# Patient Record
Sex: MALE | Race: OTHER | Employment: PART TIME | URBAN - METROPOLITAN AREA
[De-identification: names, ages, dates, MRNs, and addresses within clinical notes are randomized per-mention and may not be internally consistent; named-entity substitution may affect disease eponyms.]

---

## 2017-08-19 ENCOUNTER — OFFICE VISIT (OUTPATIENT)
Dept: FAMILY MEDICINE CLINIC | Age: 42
End: 2017-08-19

## 2017-08-19 VITALS
WEIGHT: 257 LBS | TEMPERATURE: 98.9 F | HEART RATE: 99 BPM | BODY MASS INDEX: 38.95 KG/M2 | DIASTOLIC BLOOD PRESSURE: 84 MMHG | SYSTOLIC BLOOD PRESSURE: 133 MMHG | HEIGHT: 68 IN

## 2017-08-19 DIAGNOSIS — F43.10 PTSD (POST-TRAUMATIC STRESS DISORDER): ICD-10-CM

## 2017-08-19 DIAGNOSIS — G47.00 INSOMNIA, UNSPECIFIED TYPE: ICD-10-CM

## 2017-08-19 DIAGNOSIS — E78.00 HIGH CHOLESTEROL: ICD-10-CM

## 2017-08-19 DIAGNOSIS — R73.9 ELEVATED BLOOD SUGAR: Primary | ICD-10-CM

## 2017-08-19 DIAGNOSIS — E11.9 TYPE 2 DIABETES MELLITUS WITHOUT COMPLICATION, WITHOUT LONG-TERM CURRENT USE OF INSULIN (HCC): ICD-10-CM

## 2017-08-19 DIAGNOSIS — I10 ESSENTIAL HYPERTENSION: ICD-10-CM

## 2017-08-19 LAB — GLUCOSE POC: NORMAL MG/DL

## 2017-08-19 RX ORDER — ATORVASTATIN CALCIUM 40 MG/1
40 TABLET, FILM COATED ORAL DAILY
Qty: 90 TAB | Refills: 1 | Status: SHIPPED | OUTPATIENT
Start: 2017-08-19 | End: 2017-11-14 | Stop reason: SDUPTHER

## 2017-08-19 RX ORDER — VALSARTAN AND HYDROCHLOROTHIAZIDE 320; 12.5 MG/1; MG/1
1 TABLET, FILM COATED ORAL DAILY
Qty: 90 TAB | Refills: 1 | Status: SHIPPED | OUTPATIENT
Start: 2017-08-19 | End: 2017-11-14 | Stop reason: ALTCHOICE

## 2017-08-19 RX ORDER — VALSARTAN AND HYDROCHLOROTHIAZIDE 320; 12.5 MG/1; MG/1
1 TABLET, FILM COATED ORAL DAILY
Qty: 90 TAB | Refills: 1 | Status: SHIPPED | OUTPATIENT
Start: 2017-08-19 | End: 2017-08-19 | Stop reason: SDUPTHER

## 2017-08-19 RX ORDER — METFORMIN HYDROCHLORIDE 1000 MG/1
1000 TABLET ORAL 2 TIMES DAILY WITH MEALS
Qty: 180 TAB | Refills: 1 | Status: SHIPPED | OUTPATIENT
Start: 2017-08-19 | End: 2017-11-14 | Stop reason: SDUPTHER

## 2017-08-19 RX ORDER — FENOFIBRATE 150 MG/1
CAPSULE ORAL
Qty: 90 CAP | Refills: 1 | Status: SHIPPED | OUTPATIENT
Start: 2017-08-19 | End: 2017-08-19

## 2017-08-19 RX ORDER — GLIMEPIRIDE 4 MG/1
4 TABLET ORAL
Qty: 90 TAB | Refills: 1 | Status: SHIPPED | OUTPATIENT
Start: 2017-08-19 | End: 2017-11-14 | Stop reason: SDUPTHER

## 2017-08-19 RX ORDER — ATORVASTATIN CALCIUM 40 MG/1
40 TABLET, FILM COATED ORAL DAILY
Qty: 90 TAB | Refills: 1 | Status: SHIPPED | OUTPATIENT
Start: 2017-08-19 | End: 2017-08-19 | Stop reason: SDUPTHER

## 2017-08-19 RX ORDER — AMITRIPTYLINE HYDROCHLORIDE 10 MG/1
10 TABLET, FILM COATED ORAL
Qty: 30 TAB | Refills: 2 | Status: SHIPPED | OUTPATIENT
Start: 2017-08-19 | End: 2017-11-14

## 2017-08-19 NOTE — PROGRESS NOTES
Results for orders placed or performed in visit on 08/19/17   AMB POC GLUCOSE BLOOD, BY GLUCOSE MONITORING DEVICE   Result Value Ref Range    Glucose POC  mg/dL

## 2017-08-19 NOTE — PROGRESS NOTES
Assessment/Plan:    Diagnoses and all orders for this visit:    1. Elevated blood sugar  -     AMB POC GLUCOSE BLOOD, BY GLUCOSE MONITORING DEVICE    2. Insomnia, unspecified type  -     amitriptyline (ELAVIL) 10 mg tablet; Take 1 Tab by mouth nightly. 3. High cholesterol  -     Fenofibrate 150 mg cap; Si po qd  -     atorvastatin (LIPITOR) 40 mg tablet; Take 1 Tab by mouth daily. 4. Type 2 diabetes mellitus without complication, without long-term current use of insulin (HCC)  -     metFORMIN (GLUCOPHAGE) 1,000 mg tablet; Take 1 Tab by mouth two (2) times daily (with meals). -     glimepiride (AMARYL) 4 mg tablet; Take 1 Tab by mouth every morning. 5. Essential hypertension  -     valsartan-hydroCHLOROthiazide (DIOVAN-HCT) 320-12.5 mg per tablet; Take 1 Tab by mouth daily. 6. PTSD (post-traumatic stress disorder)  He is undergoing counseling at [facility], next appt is this coming up week    Follow-up Disposition:  Return in about 4 weeks (around 2017). LUZ Blount 72 expressed understanding of this plan. An AVS was printed and given to the patient.      ----------------------------------------------------------------------    Chief Complaint   Patient presents with    Elevated Blood Pressure    Blood sugar problem    Cholesterol Problem       History of Present Illness:  New pt here for med refill on chronic meds. He is a diabetic with high cholesterol and hypertension. He was being treated by Dr Patsy Ballesteros at [facility] [place] for years but he has retired. The pt has been stretching his meds for the past 4 months. Additionally, he often was working out of state so he couldn't get in for a visit. For example, he has been taking only one of his metformin 1000mg daily rather then the 2 ordered. He has done this with all of his meds  He blood sugar is elevated and this is probably the reason why.  He often has to eat \"on the streets\" due to work so he can't always follow a diabetic diet. He is under the care of [facility] for counseling. They sent a note to me that states that the pt is suffering from anxiety. The pt tells me that he was accused by a former friend of calling the police on his brother and getting him sent to [facility]. The pt told the other mike that he did not do it but now feels threatened by the accusor. He is afraid that he is going to get in trouble for something that he did not do. He is feeling nervous and can't sleep at night since the incident about 2 months ago. He has weekly sessions with his counselor already scheduled. They have a bilingual counselor there    He starts a new job in 2 days that will require within state travel so he should be able to come back for check ups      No past medical history on file. Current Outpatient Prescriptions   Medication Sig Dispense Refill    metFORMIN (GLUCOPHAGE) 1,000 mg tablet Take 1 Tab by mouth two (2) times daily (with meals). 180 Tab 1    glimepiride (AMARYL) 4 mg tablet Take 1 Tab by mouth every morning. 90 Tab 1    Fenofibrate 150 mg cap Si po qd 90 Cap 1    atorvastatin (LIPITOR) 40 mg tablet Take 1 Tab by mouth daily. 90 Tab 1    valsartan-hydroCHLOROthiazide (DIOVAN-HCT) 320-12.5 mg per tablet Take 1 Tab by mouth daily. 90 Tab 1    amitriptyline (ELAVIL) 10 mg tablet Take 1 Tab by mouth nightly. 30 Tab 2       Allergies no known allergies    Social History   Substance Use Topics    Smoking status: Never Smoker    Smokeless tobacco: Never Used    Alcohol use No       No family history on file.     Physical Exam:     Visit Vitals    /84 (BP 1 Location: Left arm, BP Patient Position: Sitting)    Pulse 99    Temp 98.9 °F (37.2 °C) (Oral)    Ht 5' 7.91\" (1.725 m)    Wt 257 lb (116.6 kg)    BMI 39.18 kg/m2       A&Ox3  WDWN NAD  Respirations normal and non labored

## 2017-08-19 NOTE — PROGRESS NOTES
87 Svitlana Smith - reviewed meds , reordered Lipitor and Diovan to print, pt will take these with [employer] card to [facility] as they are less expensive there. All others he will  [facility],  Signed 2 records request form to fax to Dr Roberto Henry and Dr Eddi Knight to get old records. Pt taken to registration to make f/u appt in 6 weeks, asked him to come in to have labs done 2 weeks prior to his appointment. Copied letter from [email or web address] .

## 2017-08-19 NOTE — PATIENT INSTRUCTIONS
Consejos de nutrición para la diabetes: Después de la consulta  [Nutrition Tips for Diabetes: After Your Visit]  Instrucciones de cuidado  Rebecca dieta saludable es importante para el manejo de la diabetes. Puede ayudarle a bajar de peso (si lo necesita) y a no recuperarlo. Esta dieta le da los nutrientes y la energía que ackerman cuerpo necesita y le ayuda a prevenir enfermedades cardíacas (del corazón). Sin embargo, esto no significa que en rebecca dieta para personas con diabetes usted tenga que consumir alimentos especiales. Usted [employer] mismos alimentos que ackerman poncho, incluso dulces ocasionalmente y otros alimentos favoritos. Kathy debe tener en cuenta la cantidad y la frecuencia con que come ciertos alimentos. El plan correcto para usted incluirá alimentos que le ayuden a mantener ackerman nivel de azúcar en la [place] en límites sanos. Trate de comer alimentos variados y [employer] carbohidratos radha todo el día. Los carbohidratos aumentan el nivel de azúcar en la [place] y lo hacen con mayor rapidez que otros nutrientes. Estos pueden encontrarse en el azúcar, los panes y cereales, las frutas, en los vegetales con almidones, clarence las michell y [place], y en la [place] y el yogur. Sería conveniente que colabore con un dietista o educador de diabetes para que le ayuden a planificar las comidas y los refrigerios. Si leanna de melvin objetivos es la pérdida de [place], un dietista o educador de diabetes puede ayudarle. Los consejos que siguen a continuación pueden ayudarle a disfrutar melvin comidas y [employer]. La atención de seguimiento es rebecca parte clave de ackerman tratamiento y seguridad. Asegúrese de hacer y acudir a todas las citas, y llame a ackerman médico si está teniendo problemas. También es rebecca buena idea saber los resultados de los exámenes y mantener rebecca lista de los medicamentos que javier. ¿Cómo puede cuidarse en el hogar?   · Aprenda a distinguir los alimentos con carbohidratos y la cantidad de carbohidratos que debe consumir. Un dietista o educador de diabetes puede enseñarle a llevar un control de la cantidad de carbohidratos que consume. · Distribuya los carbohidratos a lo sea del día. Consuma rebecca pequeña porción de carbohidratos en cada rebecca de las comidas, tasha no demasiado de rebecca chris vez. · Planifique melvin comidas para que incluyan alimentos de todos los grupos alimentarios. Estos son [place] y algunos ejemplos de tamaños de porciones:  ¨ Granos: 1 rebanada de pan (1 onza o 28 g), ½ taza de cereal cocido y 1/3 de taza de pasta o arroz cocidos. Estas raciones contienen alrededor de 15 gramos de carbohidratos por porción. Elija comer granos enteros, clarence pan o galletas saladas integrales, silvano y [place] integral, con mayor frecuencia que granos refinados. ¨ Frutas: 1 fruta pequeña fresca, clarence rebecca manzana o rebecca naranja; ½ banana (plátano); ½ taza de fruta picada, cocida o enlatada; ½ taza de jugo de fruta; 1 taza de melón o frambuesa; y 2 cucharadas de frutas secas. Estas raciones contienen alrededor de 15 gramos de carbohidratos por porción. ¨ Productos lácteos: 1 taza de [place] [address] y 2/3 de taza de yogur natural. Estas raciones contienen alrededor de 15 gramos de carbohidratos por porción. ¨ Proteínas: Carne de froylan, nelia, pavo, pescado, huevos, tofu, queso, requesón y [address]). Rebecca porción de carne es 3 onzas, que es aproximadamente el tamaño de rebecca baraja de naipes. Ejemplos de porciones de sustitutos de la carne (igual a 1 onza de carne) son 1/4 de taza de requesón, 1 huevo, 1 cucharada de [address] de [address] y ½ taza de tofu. Estos alimentos contienen muy poca cantidad o nada de carbohidratos por porción. ¨ Vegetales: Los vegetales con almidón claernce ½ taza de frijoles (habichuelas) secos cocidos, arvejas (chícharos), michell o maíz contienen alrededor de 15 gramos de carbohidratos.  Los vegetales sin almidón contienen muy pocos carbohidratos, clarence 1 taza de verduras de hojas verdes crudas (clarence la espinaca), ½ taza de otro vegetal (cocido o vikki) y 3/4 de taza de jugo de verduras. · Use el formato del plato para planificar melvin comidas. Es rebecca [place] y rápida de asegurarse de que consuma comidas balanceadas. También le ayuda a distribuir los carbohidratos radha el día. Divida el plato por tipo de alimento. [address] [place] verduras en medio plato, la carne o melvin sustitutos en un cuarto del plato y los granos o verduras con almidones (clarence arroz integral o rebecca papa) en la cuarta parte restante del plato. A esto puede agregarle un pequeño trozo de fruta y [place] taza de [place] o yogur, según la cantidad de carbohidratos que deba consumir en rebecca comida. · Consulte a ackerman dietista o educador de diabetes sobre las formas de añadir cantidades limitadas de dulces en ackerman plan alimenticio. Usted puede comer estos alimentos de vez en cuando, siempre que incluya la cantidad de carbohidratos que contienen en la cantidad permitida diaria. · Si matt alcohol, limite el consumo a no más que 1 bebida al día si es jhonny y 2 bebidas al día si es hombre. Si está embarazada, ninguna cantidad de alcohol es sarkar. · Las proteínas, grasas y fibras no aumentan tanto el nivel de azúcar en la [place] clarence los carbohidratos. Si consume muchos de estos nutrientes en rebecca comida, el azúcar en la [place] aumentará con mayor lentitud de lo que lo haría de Maria A u Cristian. · Limite las grasas saturadas, clarence las de las joan y los productos lácteos. Trate de reemplazarlos con grasa monoinsaturada clarence, por ejemplo, el aceite de [place]. Esta es rebecca opción más saludable porque las personas con diabetes tienen un riesgo superior al promedio de enfermedad cardíaca. De todos modos, use rebecca cantidad [place] de [place]. Rebecca cucharada de aceite de renner contiene 14 gramos de grasa y 120 calorías. · Hacer ejercicio disminuye el nivel de azúcar en la [place].  Si usted se administra la dosis de insulina por medio de inyecciones o rebecca bomba, puede usar rebecca dosis ernst que si no hace ejercicio. Tenga en cuenta que los horarios son importantes. Si usted hace ejercicio 1 hora después de bianca comido, es posible que ackerman cuerpo necesite menos insulina que si lo hace 3 horas después de la comida. Mida ackerman nivel de azúcar en la [place]annah para saber cómo afecta el ejercicio ackerman necesidad de insulina. · Sigrid ejercicio la mayoría de los días de la [place]. Sigrid por lo menos 30 minutos al día. El ejercicio le ayuda a mantener un peso saludable y a que ackerman cuerpo use la insulina. Caminar es rebecca manera fácil de hacer ejercicio. Aumente en forma gradual la distancia que camina cada día. Quizás también desee nadar, montar en bicicleta o hacer otras actividades. Cuando come afuera  · Aprenda a [employer] de las porciones de alimentos que contienen carbohidratos. Si mide melvin alimentos en el hogar, será más fácil calcular la cantidad de carbohidratos en la porción del restaurante. · Si la comida que pide contiene demasiados carbohidratos (clarence michell, maíz o frijoles horneados), pida en ackerman lugar rebecca comida baja en carbohidratos. Pida rebecca ensalada o vegetales verdes. · Si [place] insulina, mídase el nivel de azúcar antes y después de salir a comer afuera para saber la cantidad que puede comer en el futuro. · Si consume más carbohidratos de lo planeado en rebecca comida, camine o sigrid ejercicio. [place] le ayudará a bajar el nivel de azúcar en la sav[place]. ¿Dónde puede encontrar más información en inglés? David Ruiz a [employer]  Jackie [place] [id number] en la búsqueda para aprender más acerca de \"Consejos de nutrición para la diabetes: Después de la consulta. \"   © 9085-8880 Healthwise, Incorporated. Instrucciones de cuidado adaptadas bajo licencia por Cassie Sapp (which disclaims liability or warranty for this information). Estas instrucciones de cuidado son para usarlas con ackerman profesional clínico registrado.  Si tiene preguntas acerca de rebecca afección médica o de estas instrucciones, pregunte siempre a ackerman profesional de [employer]. Healthwise, Incorporated niega cualquier garantía o responsabilidad por ackerman uso de esta información. Versión del contenido: [phone]; Revisado: 6 agosto, 2013                 Colesterol alto: Instrucciones de cuidado - [ High Cholesterol: Care Instructions ]  Instrucciones de cuidado  El colesterol es un tipo de grasa que está presente en la [place]. Es necesario para varias funciones corporales, clarence producir nuevas células. El colesterol se produce en el cuerpo y [place] proviene de los alimentos que se consumen. Tener colesterol alto significa que tiene demasiado de esta grasa en la [place]. [place] eleva ackerman riesgo de tener un ataque Criss Goodie y un ataque cerebral.  El LDL y el HDL son parte de ackerman colesterol total. El LDL es el colesterol \"alayna\". Un LDL alto puede aumentar ackerman riesgo de tener rebecca enfermedad cardíaca, un ataque [place] y un ataque cerebral. El HDL es el colesterol \"butts\". Ayuda a eliminar el colesterol alayna del organismo. Un HDL alto está vinculado con un riesgo más bajo de tener rebecca enfermedad cardíaca, un ataque al corazón y un ataque cerebral.  Zarina niveles de colesterol ayudan a ackerman médico a determinar ackerman riesgo de tener un ataque al corazón o un ataque cerebral. Usted y ackerman médico pueden hablar acerca de si necesita reducir ackerman riesgo y del tratamiento que sea mejor para usted. Un estilo de galina saludable para el corazón junto con medicamentos puede ayudar a reducir ackerman colesterol y ackerman riesgo. El modo que usted elija para reducir ackerman riesgo dependerá de lo elevado que sea ackerman riesgo de tener un ataque al corazón y un ataque cerebral. También dependerá de lo que piense acerca de rama medicamentos. La atención de seguimiento es rebecca parte clave de ackerman tratamiento y seguridad. Asegúrese de hacer y acudir a todas las citas, y llame a ackerman médico si está teniendo problemas.  También es rebecca buena idea saber los resultados de melvin exámenes y mantener rebecca lista de los medicamentos que javier. ¿Cómo puede cuidarse en el hogar? · Coma alimentos variados todos los martín. [place] opciones son las frutas, las verduras, los granos integrales (clarence la harina de silvano), los frijoles secos y las arvejas (chícharos), las nueces y las semillas, los productos de soya (clarence el tofu) y los productos lácteos con bajo contenido graso o sin grasa. · Reemplace la mantequilla, la margarina y los aceites hidrogenados o parcialmente hidrogenados por aceite de renner o de canola (colza). (Puede consumir margarina de aceite de canola sin grasa trans). · Reemplace las joan matthews por pescado, carne de ave y proteína de soya (clarence el tofu). · Limite el consumo de alimentos procesados y envasados, clarence los \"chips\" (clarence michell fritas), las galletas saladas y las galletas dulces. · Cocine los alimentos al horno, a la roman o al vapor. No los fría. · Manténgase físicamente activo. Josie por lo menos 30 minutos de ejercicio la mayoría de los días de la [place]. Caminar es rebecca buena opción. Podría hacer otras actividades, clarence correr, nadar, [employer] en [place], o jugar al tenis u otros deportes de equipo. · Manténgase en un peso saludable o baje de peso cambiando los hábitos alimenticios y la [place] según lo descrito arriba. Si baja aunque sea solo un poco de [place], incluso 5 a 10 libras (2.2 a 4.5 kg), puede disminuir el riesgo de tener un ataque al corazón o un ataque cerebral.  · No fume. ¿Cuándo debe pedir ayuda? Preste especial atención a los cambios en ackerman nandini y asegúrese de comunicarse con ackerman médico si:  · Necesita ayuda para hacer cambios en el estilo de galina. · Tiene preguntas sobre ackerman medicamento. ¿Dónde puede encontrar más información en inglés? Ariana Telles a http://rick-adrianna.info/. Cyndee Skinner [phone] en la búsqueda para aprender más acerca de \"Colesterol alto:  Instrucciones de cuidado - [ High Cholesterol: Care Instructions ]. \"  Revisado: 3 sara, 2017  Versión del contenido: 11.3  © 3134-6417 Healthwise, Incorporated. Las instrucciones de cuidado fueron adaptadas bajo licencia por [employer] (which disclaims liability or warranty for this information). Si usted tiene [place] afección médica o sobre estas instrucciones, siempre pregunte a ackerman profesional de nandini. Healthwise, Incorporated niega toda garantía o responsabilidad por ackermna uso de esta información.

## 2017-11-01 ENCOUNTER — HOSPITAL ENCOUNTER (INPATIENT)
Age: 42
LOS: 1 days | Discharge: HOME | DRG: 916 | End: 2017-11-02
Attending: EMERGENCY MEDICINE | Admitting: INTERNAL MEDICINE
Payer: SELF-PAY

## 2017-11-01 ENCOUNTER — APPOINTMENT (OUTPATIENT)
Dept: GENERAL RADIOLOGY | Age: 42
DRG: 916 | End: 2017-11-01
Attending: EMERGENCY MEDICINE
Payer: SELF-PAY

## 2017-11-01 DIAGNOSIS — T78.2XXA ANAPHYLAXIS, INITIAL ENCOUNTER: Primary | ICD-10-CM

## 2017-11-01 PROBLEM — K02.9 DENTAL CARIES: Status: ACTIVE | Noted: 2017-11-01

## 2017-11-01 PROBLEM — I10 HTN (HYPERTENSION), BENIGN: Status: ACTIVE | Noted: 2017-11-01

## 2017-11-01 PROBLEM — E11.9 TYPE 2 DIABETES MELLITUS WITHOUT COMPLICATION (HCC): Status: ACTIVE | Noted: 2017-11-01

## 2017-11-01 PROBLEM — E78.5 HYPERLIPIDEMIA: Status: ACTIVE | Noted: 2017-11-01

## 2017-11-01 LAB
ALBUMIN SERPL-MCNC: 3.7 G/DL (ref 3.5–5)
ALBUMIN/GLOB SERPL: 1 {RATIO} (ref 1.1–2.2)
ALP SERPL-CCNC: 78 U/L (ref 45–117)
ALT SERPL-CCNC: 36 U/L (ref 12–78)
ANION GAP SERPL CALC-SCNC: 15 MMOL/L (ref 5–15)
AST SERPL-CCNC: 25 U/L (ref 15–37)
BASOPHILS # BLD: 0 K/UL (ref 0–0.1)
BASOPHILS NFR BLD: 0 % (ref 0–1)
BILIRUB SERPL-MCNC: 0.4 MG/DL (ref 0.2–1)
BNP SERPL-MCNC: 17 PG/ML (ref 0–125)
BUN SERPL-MCNC: 17 MG/DL (ref 6–20)
BUN/CREAT SERPL: 11 (ref 12–20)
CALCIUM SERPL-MCNC: 9.8 MG/DL (ref 8.5–10.1)
CHLORIDE SERPL-SCNC: 104 MMOL/L (ref 97–108)
CK SERPL-CCNC: 171 U/L (ref 39–308)
CO2 SERPL-SCNC: 23 MMOL/L (ref 21–32)
CREAT SERPL-MCNC: 1.54 MG/DL (ref 0.7–1.3)
EOSINOPHIL # BLD: 0 K/UL (ref 0–0.4)
EOSINOPHIL NFR BLD: 0 % (ref 0–7)
ERYTHROCYTE [DISTWIDTH] IN BLOOD BY AUTOMATED COUNT: 14.6 % (ref 11.5–14.5)
GLOBULIN SER CALC-MCNC: 3.6 G/DL (ref 2–4)
GLUCOSE SERPL-MCNC: 262 MG/DL (ref 65–100)
HCT VFR BLD AUTO: 48.3 % (ref 36.6–50.3)
HGB BLD-MCNC: 17 G/DL (ref 12.1–17)
LYMPHOCYTES # BLD: 7.9 K/UL (ref 0.8–3.5)
LYMPHOCYTES NFR BLD: 67 % (ref 12–49)
MCH RBC QN AUTO: 30.4 PG (ref 26–34)
MCHC RBC AUTO-ENTMCNC: 35.2 G/DL (ref 30–36.5)
MCV RBC AUTO: 86.4 FL (ref 80–99)
MONOCYTES # BLD: 0.4 K/UL (ref 0–1)
MONOCYTES NFR BLD: 3 % (ref 5–13)
NEUTS SEG # BLD: 3.5 K/UL (ref 1.8–8)
NEUTS SEG NFR BLD: 30 % (ref 32–75)
PLATELET # BLD AUTO: 222 K/UL (ref 150–400)
POTASSIUM SERPL-SCNC: 3.6 MMOL/L (ref 3.5–5.1)
PROT SERPL-MCNC: 7.3 G/DL (ref 6.4–8.2)
RBC # BLD AUTO: 5.59 M/UL (ref 4.1–5.7)
RBC MORPH BLD: ABNORMAL
SODIUM SERPL-SCNC: 142 MMOL/L (ref 136–145)
TROPONIN I BLD-MCNC: <0.04 NG/ML (ref 0–0.08)
WBC # BLD AUTO: 11.8 K/UL (ref 4.1–11.1)
WBC MORPH BLD: ABNORMAL

## 2017-11-01 PROCEDURE — 74011250636 HC RX REV CODE- 250/636: Performed by: EMERGENCY MEDICINE

## 2017-11-01 PROCEDURE — 99284 EMERGENCY DEPT VISIT MOD MDM: CPT

## 2017-11-01 PROCEDURE — 96374 THER/PROPH/DIAG INJ IV PUSH: CPT

## 2017-11-01 PROCEDURE — 96372 THER/PROPH/DIAG INJ SC/IM: CPT

## 2017-11-01 PROCEDURE — 82550 ASSAY OF CK (CPK): CPT | Performed by: EMERGENCY MEDICINE

## 2017-11-01 PROCEDURE — 85025 COMPLETE CBC W/AUTO DIFF WBC: CPT | Performed by: EMERGENCY MEDICINE

## 2017-11-01 PROCEDURE — 83036 HEMOGLOBIN GLYCOSYLATED A1C: CPT | Performed by: INTERNAL MEDICINE

## 2017-11-01 PROCEDURE — 74011000250 HC RX REV CODE- 250: Performed by: INTERNAL MEDICINE

## 2017-11-01 PROCEDURE — 77030013140 HC MSK NEB VYRM -A

## 2017-11-01 PROCEDURE — 80053 COMPREHEN METABOLIC PANEL: CPT | Performed by: EMERGENCY MEDICINE

## 2017-11-01 PROCEDURE — 96375 TX/PRO/DX INJ NEW DRUG ADDON: CPT

## 2017-11-01 PROCEDURE — 36415 COLL VENOUS BLD VENIPUNCTURE: CPT | Performed by: EMERGENCY MEDICINE

## 2017-11-01 PROCEDURE — 96361 HYDRATE IV INFUSION ADD-ON: CPT

## 2017-11-01 PROCEDURE — 93005 ELECTROCARDIOGRAM TRACING: CPT

## 2017-11-01 PROCEDURE — 74011000250 HC RX REV CODE- 250: Performed by: EMERGENCY MEDICINE

## 2017-11-01 PROCEDURE — 74011250636 HC RX REV CODE- 250/636

## 2017-11-01 PROCEDURE — 71010 XR CHEST PORT: CPT

## 2017-11-01 PROCEDURE — 83880 ASSAY OF NATRIURETIC PEPTIDE: CPT | Performed by: EMERGENCY MEDICINE

## 2017-11-01 PROCEDURE — 84484 ASSAY OF TROPONIN QUANT: CPT

## 2017-11-01 PROCEDURE — 94640 AIRWAY INHALATION TREATMENT: CPT

## 2017-11-01 PROCEDURE — 65610000006 HC RM INTENSIVE CARE

## 2017-11-01 PROCEDURE — 74011250636 HC RX REV CODE- 250/636: Performed by: INTERNAL MEDICINE

## 2017-11-01 RX ORDER — CYCLOBENZAPRINE HCL 10 MG
10 TABLET ORAL
COMMUNITY
End: 2018-05-06

## 2017-11-01 RX ORDER — MAGNESIUM SULFATE 100 %
4 CRYSTALS MISCELLANEOUS AS NEEDED
Status: DISCONTINUED | OUTPATIENT
Start: 2017-11-01 | End: 2017-11-02 | Stop reason: HOSPADM

## 2017-11-01 RX ORDER — GLIMEPIRIDE 4 MG/1
4 TABLET ORAL DAILY
COMMUNITY
End: 2018-05-04 | Stop reason: SDUPTHER

## 2017-11-01 RX ORDER — DIPHENHYDRAMINE HYDROCHLORIDE 50 MG/ML
INJECTION, SOLUTION INTRAMUSCULAR; INTRAVENOUS
Status: COMPLETED
Start: 2017-11-01 | End: 2017-11-01

## 2017-11-01 RX ORDER — TRAMADOL HYDROCHLORIDE 50 MG/1
50 TABLET ORAL
COMMUNITY
End: 2018-05-06

## 2017-11-01 RX ORDER — ACETAMINOPHEN 325 MG/1
650 TABLET ORAL
Status: DISCONTINUED | OUTPATIENT
Start: 2017-11-01 | End: 2017-11-02 | Stop reason: HOSPADM

## 2017-11-01 RX ORDER — FENOFIBRATE 150 MG/1
150 CAPSULE ORAL DAILY
COMMUNITY
End: 2018-05-04 | Stop reason: SDUPTHER

## 2017-11-01 RX ORDER — MORPHINE SULFATE 2 MG/ML
4 INJECTION, SOLUTION INTRAMUSCULAR; INTRAVENOUS ONCE
Status: COMPLETED | OUTPATIENT
Start: 2017-11-01 | End: 2017-11-01

## 2017-11-01 RX ORDER — DEXTROSE 50 % IN WATER (D50W) INTRAVENOUS SYRINGE
12.5-25 AS NEEDED
Status: DISCONTINUED | OUTPATIENT
Start: 2017-11-01 | End: 2017-11-02 | Stop reason: HOSPADM

## 2017-11-01 RX ORDER — MORPHINE SULFATE 2 MG/ML
2 INJECTION, SOLUTION INTRAMUSCULAR; INTRAVENOUS
Status: DISCONTINUED | OUTPATIENT
Start: 2017-11-01 | End: 2017-11-02 | Stop reason: HOSPADM

## 2017-11-01 RX ORDER — ENOXAPARIN SODIUM 100 MG/ML
40 INJECTION SUBCUTANEOUS EVERY 24 HOURS
Status: DISCONTINUED | OUTPATIENT
Start: 2017-11-02 | End: 2017-11-02 | Stop reason: HOSPADM

## 2017-11-01 RX ORDER — ONDANSETRON 2 MG/ML
INJECTION INTRAMUSCULAR; INTRAVENOUS
Status: COMPLETED
Start: 2017-11-01 | End: 2017-11-01

## 2017-11-01 RX ORDER — INSULIN LISPRO 100 [IU]/ML
INJECTION, SOLUTION INTRAVENOUS; SUBCUTANEOUS
Status: DISCONTINUED | OUTPATIENT
Start: 2017-11-02 | End: 2017-11-02 | Stop reason: HOSPADM

## 2017-11-01 RX ORDER — METHOCARBAMOL 500 MG/1
500 TABLET, FILM COATED ORAL
COMMUNITY
End: 2018-05-06

## 2017-11-01 RX ORDER — AMOXICILLIN AND CLAVULANATE POTASSIUM 875; 125 MG/1; MG/1
1 TABLET, FILM COATED ORAL EVERY 12 HOURS
COMMUNITY
End: 2017-11-02

## 2017-11-01 RX ORDER — ALBUTEROL SULFATE 1.25 MG/3ML
1.25 SOLUTION RESPIRATORY (INHALATION)
Status: DISCONTINUED | OUTPATIENT
Start: 2017-11-01 | End: 2017-11-02 | Stop reason: HOSPADM

## 2017-11-01 RX ORDER — METFORMIN HYDROCHLORIDE 1000 MG/1
1000 TABLET ORAL 2 TIMES DAILY
COMMUNITY
End: 2018-05-04 | Stop reason: SDUPTHER

## 2017-11-01 RX ORDER — EPINEPHRINE 1 MG/ML
0.3 INJECTION, SOLUTION, CONCENTRATE INTRAVENOUS
Status: COMPLETED | OUTPATIENT
Start: 2017-11-01 | End: 2017-11-01

## 2017-11-01 RX ORDER — SODIUM CHLORIDE 0.9 % (FLUSH) 0.9 %
5-10 SYRINGE (ML) INJECTION AS NEEDED
Status: DISCONTINUED | OUTPATIENT
Start: 2017-11-01 | End: 2017-11-02 | Stop reason: HOSPADM

## 2017-11-01 RX ORDER — NALOXONE HYDROCHLORIDE 0.4 MG/ML
0.4 INJECTION, SOLUTION INTRAMUSCULAR; INTRAVENOUS; SUBCUTANEOUS AS NEEDED
Status: DISCONTINUED | OUTPATIENT
Start: 2017-11-01 | End: 2017-11-02 | Stop reason: HOSPADM

## 2017-11-01 RX ORDER — SODIUM CHLORIDE 0.9 % (FLUSH) 0.9 %
5-10 SYRINGE (ML) INJECTION EVERY 8 HOURS
Status: DISCONTINUED | OUTPATIENT
Start: 2017-11-02 | End: 2017-11-02 | Stop reason: HOSPADM

## 2017-11-01 RX ORDER — GUAIFENESIN 100 MG/5ML
81 LIQUID (ML) ORAL DAILY
COMMUNITY

## 2017-11-01 RX ORDER — FAMOTIDINE 10 MG/ML
20 INJECTION INTRAVENOUS
Status: COMPLETED | OUTPATIENT
Start: 2017-11-01 | End: 2017-11-01

## 2017-11-01 RX ORDER — SODIUM CHLORIDE 9 MG/ML
1000 INJECTION, SOLUTION INTRAVENOUS ONCE
Status: COMPLETED | OUTPATIENT
Start: 2017-11-01 | End: 2017-11-01

## 2017-11-01 RX ORDER — DIPHENHYDRAMINE HYDROCHLORIDE 50 MG/ML
50 INJECTION, SOLUTION INTRAMUSCULAR; INTRAVENOUS
Status: COMPLETED | OUTPATIENT
Start: 2017-11-01 | End: 2017-11-01

## 2017-11-01 RX ORDER — SODIUM CHLORIDE 9 MG/ML
125 INJECTION, SOLUTION INTRAVENOUS CONTINUOUS
Status: DISCONTINUED | OUTPATIENT
Start: 2017-11-02 | End: 2017-11-02

## 2017-11-01 RX ORDER — ATORVASTATIN CALCIUM 40 MG/1
40 TABLET, FILM COATED ORAL DAILY
COMMUNITY
End: 2018-05-04 | Stop reason: SDUPTHER

## 2017-11-01 RX ORDER — DIPHENHYDRAMINE HYDROCHLORIDE 50 MG/ML
25 INJECTION, SOLUTION INTRAMUSCULAR; INTRAVENOUS
Status: DISCONTINUED | OUTPATIENT
Start: 2017-11-01 | End: 2017-11-02 | Stop reason: HOSPADM

## 2017-11-01 RX ORDER — ONDANSETRON 2 MG/ML
8 INJECTION INTRAMUSCULAR; INTRAVENOUS
Status: COMPLETED | OUTPATIENT
Start: 2017-11-01 | End: 2017-11-01

## 2017-11-01 RX ORDER — PROCHLORPERAZINE EDISYLATE 5 MG/ML
5 INJECTION INTRAMUSCULAR; INTRAVENOUS
Status: DISCONTINUED | OUTPATIENT
Start: 2017-11-01 | End: 2017-11-02 | Stop reason: HOSPADM

## 2017-11-01 RX ADMIN — METHYLPREDNISOLONE SODIUM SUCCINATE 125 MG: 125 INJECTION, POWDER, FOR SOLUTION INTRAMUSCULAR; INTRAVENOUS at 19:15

## 2017-11-01 RX ADMIN — FAMOTIDINE 20 MG: 10 INJECTION, SOLUTION INTRAVENOUS at 19:30

## 2017-11-01 RX ADMIN — ALBUTEROL SULFATE 1 DOSE: 2.5 SOLUTION RESPIRATORY (INHALATION) at 20:11

## 2017-11-01 RX ADMIN — FAMOTIDINE 20 MG: 10 INJECTION, SOLUTION INTRAVENOUS at 23:27

## 2017-11-01 RX ADMIN — MORPHINE SULFATE 4 MG: 2 INJECTION, SOLUTION INTRAMUSCULAR; INTRAVENOUS at 20:54

## 2017-11-01 RX ADMIN — SODIUM CHLORIDE 1000 ML: 900 INJECTION, SOLUTION INTRAVENOUS at 19:35

## 2017-11-01 RX ADMIN — DIPHENHYDRAMINE HYDROCHLORIDE 50 MG: 50 INJECTION INTRAMUSCULAR; INTRAVENOUS at 19:15

## 2017-11-01 RX ADMIN — ENOXAPARIN SODIUM 40 MG: 40 INJECTION SUBCUTANEOUS at 23:27

## 2017-11-01 RX ADMIN — SODIUM CHLORIDE 125 ML/HR: 900 INJECTION, SOLUTION INTRAVENOUS at 23:18

## 2017-11-01 RX ADMIN — ONDANSETRON 8 MG: 2 INJECTION INTRAMUSCULAR; INTRAVENOUS at 19:31

## 2017-11-01 RX ADMIN — EPINEPHRINE 0.3 MG: 1 INJECTION, SOLUTION, CONCENTRATE INTRAVENOUS at 19:10

## 2017-11-01 RX ADMIN — DIPHENHYDRAMINE HYDROCHLORIDE 50 MG: 50 INJECTION, SOLUTION INTRAMUSCULAR; INTRAVENOUS at 19:15

## 2017-11-01 NOTE — ED PROVIDER NOTES
HPI Comments: 35 y.o. male with past medical history significant for HTN who presents from home with wife at bedside with chief complaint of allergic reaction. As per the wife, the pt was seen at [facility] 2 hours PTA, was dx with a dental infection, and was prescribed Augmentin. The pt took a dose of Augmentin 10 minutes PTA. Within a minute, the pt started to become itchy and his face started to swell. The pt arrived to the ED by car. Upon presentation, he was in acute severe distress with marked facial swelling and difficulty breathing. He was prof dis [place] immediately back. It was clear he was going through severe anaphylaxis. He was given . 3 CC epinephrine IM. The pt was then placed on a monitor, IV accessed obtained. The pt was placed in BiPAP. Then the pt rapidly improved to the point where the BiPAP was discontinued. He was placed on 4L nasal canula O2. The pt had received 50 Benedryl, 20 Pepcid ,  mg solumedrol just after being placed on BiPAP. He had an episode of profuse vomiting and was given 8 mg IV Zofran. Within 10 minutes, the pt started to improve significantly. Within 20 minutes, his diaphoresis resolved. The pt became comfortable and was able to talk with us. PCP: No primary care provider on file. Full history, physical exam, and ROS unable to be obtained due to:  Respiratory distress. Note written by Fulton Hodgkin, Scribe, as dictated by Shiela Bland MD 7:34 PM      The history is provided by the patient. No  was used. No past medical history on file. No past surgical history on file. No family history on file. Social History     Social History    Marital status: N/A     Spouse name: N/A    Number of children: N/A    Years of education: N/A     Occupational History    Not on file.      Social History Main Topics    Smoking status: Not on file    Smokeless tobacco: Not on file    Alcohol use Not on file    Drug use: Not on file    Sexual activity: Not on file     Other Topics Concern    Not on file     Social History Narrative         ALLERGIES: Augmentin [amoxicillin-pot clavulanate] and Penicillins    Review of Systems   Unable to perform ROS: Other   All other systems reviewed and are negative. There were no vitals filed for this visit. Physical Exam   Constitutional: He is oriented to person, place, and time. He appears well-developed and well-nourished. No distress. HENT:   Head: Normocephalic and atraumatic. Facial swelling;     Eyes: Conjunctivae are normal. No scleral icterus. Neck: Neck supple. No tracheal deviation present. Cardiovascular: Normal rate, regular rhythm, normal heart sounds and intact distal pulses. Exam reveals no gallop and no friction rub. No murmur heard. Pulmonary/Chest: Breath sounds normal. He is in respiratory distress (acute). He has no wheezes. He has no rales. Pulse oximeter reading 88% RA; Abdominal: Soft. He exhibits no distension. There is no tenderness. There is no rebound and no guarding. Musculoskeletal: He exhibits no edema. Neurological: He is alert and oriented to person, place, and time. Skin: Skin is warm. No rash noted. He is diaphoretic (profuse). Psychiatric: He has a normal mood and affect. Nursing note and vitals reviewed.      Note written by Lulu Arenas, as dictated by Germán Gross MD 7:36 PM    MDM  Number of Diagnoses or Management Options  Anaphylaxis, initial encounter:      Amount and/or Complexity of Data Reviewed  Clinical lab tests: ordered and reviewed  Tests in the radiology section of CPT®: ordered and reviewed  Tests in the medicine section of CPT®: ordered and reviewed  Discussion of test results with the performing providers: yes  Obtain history from someone other than the patient: yes  Discuss the patient with other providers: yes    Total critical care time spent exclusive of procedures: 36 minutes  ED Course       Procedures  ED EKG interpretation:  Rhythm: sinus tachycardia; and regular . Rate (approx.): 120; Axis: normal; ST/T wave: normal; septal infarct; Note written by Lulu Boudreaux, as dictated by Jonelle Monteiro MD 7:16 PM    7:25 PM  Checked on the pt. He is feeling better. 7:42PM  Rechecked the pt. He is doing better.       8:31 PM  Spoke with [facility]. The pt had a shot of Toradol at [facility].

## 2017-11-01 NOTE — IP AVS SNAPSHOT
303 [facility] 104 1007 [facility] 
[phone] Patient: Amarilis Boogie MRN: [id number] :1977 About your hospitalization You were admitted on:  2017 You last received care in the:  [facility] 3 INTENSIVE CARE You were discharged on:  2017 Why you were hospitalized Your primary diagnosis was: Anaphylactic Reaction Your diagnoses also included:  Type 2 Diabetes Mellitus Without Complication (Hcc), Dental Caries, Htn (Hypertension), Benign, Hyperlipidemia Things You Need To Do (next 8 weeks) Follow up with follow up with primary care doctor within 3 days Follow up with see an allergist for allergy testing  Go to [place], [facility] [facility] follow-up appointment at [address] [place] [address]. Please arrive at 7:45AM to complete new patient paperwork. Phone:  [phone] Where:  [address], [address] [place] [address] Discharge Orders None A check wily indicates which time of day the medication should be taken. My Medications STOP taking these medications AUGMENTIN 875-125 mg per tablet Generic drug:  amoxicillin-clavulanate  
   
  
 valsartan 320 mg tab 320 mg, hydroCHLOROthiazide 12.5 mg cap 12.5 mg  
   
  
  
TAKE these medications as instructed Instructions Each Dose to Equal  
 Morning Noon Evening Bedtime  
 aspirin 81 mg chewable tablet Your last dose was: Your next dose is: Take 81 mg by mouth daily. 81 mg  
    
   
   
   
  
 atorvastatin 40 mg tablet Commonly known as:  LIPITOR Your last dose was: Your next dose is: Take 40 mg by mouth daily. 40 mg  
    
   
   
   
  
 clindamycin 300 mg capsule Commonly known as:  CLEOCIN Your last dose was: Your next dose is: Take 1 Cap by mouth three (3) times daily for 5 days. 300 mg  
    
   
   
   
  
 cyclobenzaprine 10 mg tablet Commonly known as:  FLEXERIL Your last dose was: Your next dose is: Take 10 mg by mouth three (3) times daily as needed for Muscle Spasm(s). 10 mg  
    
   
   
   
  
 diphenhydrAMINE 25 mg capsule Commonly known as:  BENADRYL Your last dose was: Your next dose is: Take 1 Cap by mouth every six (6) hours as needed for up to 10 days. 25 mg  
    
   
   
   
  
 EPINEPHrine 0.3 mg/0.3 mL injection Commonly known as:  Stoney Roman Your last dose was: Your next dose is: 0.3 mL by IntraMUSCular route once as needed for up to 1 dose. 0.3 mg  
    
   
   
   
  
 famotidine 20 mg tablet Commonly known as:  PEPCID Your last dose was: Your next dose is: Take 1 Tab by mouth two (2) times a day for 3 days. 20 mg Fenofibrate 150 mg Cap Your last dose was: Your next dose is: Take 150 mg by mouth daily. 150 mg  
    
   
   
   
  
 glimepiride 4 mg tablet Commonly known as:  AMARYL Your last dose was: Your next dose is: Take 4 mg by mouth daily. 4 mg  
    
   
   
   
  
 metFORMIN 1,000 mg tablet Commonly known as:  GLUCOPHAGE Your last dose was: Your next dose is: Take 1,000 mg by mouth two (2) times a day. 1000 mg  
    
   
   
   
  
 OTHER(NON-FORMULARY) Your last dose was: Your next dose is:    
   
   
 Centrum Heart MVI daily Centrum Whole Body MVI daily  
     
   
   
   
  
 predniSONE 20 mg tablet Commonly known as:  Berle Pouch Your last dose was: Your next dose is:    
   
   
 3 tabs (60 mg) daily x 2 days, then 2 tabs (40 mg) daily x 2 days, then 1 tabs (20 mg) daily x 2 days ROBAXIN 500 mg tablet Generic drug:  methocarbamol Your last dose was: Your next dose is: Take 500 mg by mouth daily as needed. 500 mg  
    
   
   
   
  
 traMADol 50 mg tablet Commonly known as:  ULTRAM  
   
Your last dose was: Your next dose is: Take 50 mg by mouth every eight (8) hours as needed for Pain. 50 mg Where to Get Your Medications Information on where to get these meds will be given to you by the nurse or doctor. ! Ask your nurse or doctor about these medications  
  clindamycin 300 mg capsule  
 diphenhydrAMINE 25 mg capsule EPINEPHrine 0.3 mg/0.3 mL injection  
 famotidine 20 mg tablet  
 predniSONE 20 mg tablet Discharge Instructions HOSPITALIST DISCHARGE INSTRUCTIONS 
NAME: Dariela Zaidi :  1977 MRN:  [id number] Date/Time:  2017 8:08 AM 
 
ADMIT DATE: 2017 DISCHARGE DATE: 2017 PRINCIPAL DISCHARGE DIAGNOSES: 
Severe allergic reaction (anaphylaxis) MEDICATIONS: 
· It is important that you take the medication exactly as they are prescribed. Note the changes and additions to your medications. Be sure you understand these changes before you are discharged today. · Keep your medication in the bottles provided by the pharmacist and keep a list of the medication names, dosages, and times to be taken in your wallet. · Do not take other medications without consulting your doctor. · Do not take Penicillins, Augmentin, Amoxicillin, Toradol and other NSAIDs (nonsteroid anti-inflammatory drugs) such as Motrin, Aleve, Advil, ibuprofen, Naproxen, etc. 
 
 
Pain Management: per above medications What to do at [facility] Recommended diet:  Cardiac Diet and Diabetic Diet Recommended activity: Activity as tolerated If you experience any of the following symptoms then please call your primary care physician or return to the emergency room if you cannot get hold of your doctor: 
rash, severe chest pain, shortness of breath, facial or tongue swelling, severe abdominal pain, nausea, vomiting, diarrhea, or other severe concerning symptoms that brought you to the hospital in the first place Follow Up: Follow-up Information Follow up With Details Comments Contact Info  
 follow up with primary care doctor within 3 days     
 see an allergist for allergy testing Jr Hicks MD Go on 11/6/2017 Hospital follow-up appointment at 8:00AM. Please arrive at 7:45AM to complete new patient paperwork. [facility] [place] [address] [address] 
[phone] Information obtained by : 
I understand that if any problems occur once I am at home I am to contact my physician. I understand and acknowledge receipt of the instructions indicated above. Physician's or R.N.'s Signature                                                                  Date/Time Patient or Representative Signature                                                          Date/Time [employer] Announcement We are excited to announce that we are making your provider's discharge notes available to you in [employer]. You will see these notes when they are completed and signed by the physician that discharged you from your recent hospital stay. If you have any questions or concerns about any information you see in INCHRONt, please call the Health Information Department where you were seen or reach out to your Primary Care Provider for more information about your plan of care. Introducing [facility]! [facility] introduce jonas Granados .  Marty rankin partes de ackerman expediente médico, enviar por correo electrónico la oficina de ackerman médico y solicitar renovaciones de medicamentos en línea. En ackerman navegador de Internet , Stephany Ríos a [email or web address] Sigrid clic en el usuario por Jewel Burnett? Harvic Bustosmen clic aquí en la sesión Ellan Swansea. Verá la página de registro Bard. Ingrese ackerman código de [employer] juliocesar y clarence aparece a continuación. Usted no tendrá que [employer] código después de bianca completado el proceso de registro . Si usted no se inscribe antes de la fecha de caducidad , debe solicitar un nuevo código. · MyChart Código de acceso : [id number] Expires: 1/31/2018  9:26 AM 
 
Ingresa los últimos cuatro dígitos de ackerman Número de Seguro Social ( xxxx ) y fecha de nacimiento ( dd / mm / aaaa ) clarence se indica y sigrid clic en Enviar. Usted será llevado a la siguiente página de registro . Crear un ID MyChart . Esta será ackerman ID de inicio de sesión de MyChart y no puede ser [place] , por lo que pensar en rebecca que es [place] y fácil de recordar . Crear rebecca contraseña MyChart . Usted puede cambiar ackerman contraseña en cualquier momento . Ingrese ackerman Password Reset de preguntas y Pearl . [place]man[place] se puede utilizar en un momento posterior si usted olvida ackerman contraseña. Introduzca ackerman dirección de correo electrónico . Therese Graves recibirá rebecca notificación por correo electrónico cuando la nueva información está disponible en MyChart . Myron Ready clic en Registrarse. Celestino Schirmer nazanin y descargar porciones de ackerman expediente médico. 
Sigrid clic en el enlace de descarga del menú Resumen para descargar rebecca copia portátil de ackerman información médica . Si tiene [employer] , por favor visite la sección de preguntas frecuentes del sitio web MyChart . Recuerde, MyChart NO es que se utilizará para las necesidades urgentes. Para emergencias médicas , llame al 911 . Ahora disponible en ackerman iPhone y Android ! Unresulted Labs-Please follow up with your PCP about these lab tests Order Current Status CULTURE, MRSA In process Providers Seen During Your Hospitalization Provider Specialty Primary office phone Sy Maldonado MD Emergency Medicine [phone] Santos De La Torre MD Internal Medicine [phone] Saida Calvo MD Internal Medicine [phone] Your Primary Care Physician (PCP) Primary Care Physician Office Phone Office Fax NONE ** None ** ** None ** You are allergic to the following Allergen Reactions Augmentin (Amoxicillin-Pot Clavulanate) Anaphylaxis Penicillins Anaphylaxis Toradol (Ketorolac) Anaphylaxis Recent Documentation Height Weight BMI Smoking Status 1.778 m 115.8 kg 36.63 kg/m2 Never Smoker Emergency Contacts Name Discharge Info Relation Home Work Mobile [facility] DISCHARGE CAREGIVER [3] Friend [5]   [phone] Patient Belongings The following personal items are in your possession at time of discharge: 
  Dental Appliances: None  Visual Aid: None      Home Medications: None   Jewelry: None  Clothing: At bedside, Pants    Other Valuables: Cell Phone Please provide this summary of care documentation to your next provider. Signatures-by signing, you are acknowledging that this After Visit Summary has been reviewed with you and you have received a copy. Patient Signature:  ____________________________________________________________ Date:  ____________________________________________________________  
  
Ashley Landa Provider Signature:  ____________________________________________________________ Date:  ____________________________________________________________

## 2017-11-02 VITALS
BODY MASS INDEX: 36.55 KG/M2 | HEART RATE: 89 BPM | OXYGEN SATURATION: 93 % | RESPIRATION RATE: 24 BRPM | DIASTOLIC BLOOD PRESSURE: 61 MMHG | SYSTOLIC BLOOD PRESSURE: 113 MMHG | WEIGHT: 255.29 LBS | TEMPERATURE: 97.9 F | HEIGHT: 70 IN

## 2017-11-02 LAB
ALBUMIN SERPL-MCNC: 3.3 G/DL (ref 3.5–5)
ALBUMIN/GLOB SERPL: 1 {RATIO} (ref 1.1–2.2)
ALP SERPL-CCNC: 63 U/L (ref 45–117)
ALT SERPL-CCNC: 34 U/L (ref 12–78)
ANION GAP SERPL CALC-SCNC: 10 MMOL/L (ref 5–15)
AST SERPL-CCNC: 17 U/L (ref 15–37)
ATRIAL RATE: 120 BPM
BASOPHILS # BLD: 0 K/UL (ref 0–0.1)
BASOPHILS NFR BLD: 0 % (ref 0–1)
BILIRUB SERPL-MCNC: 0.4 MG/DL (ref 0.2–1)
BUN SERPL-MCNC: 22 MG/DL (ref 6–20)
BUN/CREAT SERPL: 14 (ref 12–20)
CALCIUM SERPL-MCNC: 9 MG/DL (ref 8.5–10.1)
CALCULATED P AXIS, ECG09: 75 DEGREES
CALCULATED R AXIS, ECG10: 49 DEGREES
CALCULATED T AXIS, ECG11: 69 DEGREES
CHLORIDE SERPL-SCNC: 106 MMOL/L (ref 97–108)
CO2 SERPL-SCNC: 25 MMOL/L (ref 21–32)
CREAT SERPL-MCNC: 1.6 MG/DL (ref 0.7–1.3)
DIAGNOSIS, 93000: NORMAL
EOSINOPHIL # BLD: 0 K/UL (ref 0–0.4)
EOSINOPHIL NFR BLD: 0 % (ref 0–7)
ERYTHROCYTE [DISTWIDTH] IN BLOOD BY AUTOMATED COUNT: 14.7 % (ref 11.5–14.5)
EST. AVERAGE GLUCOSE BLD GHB EST-MCNC: 180 MG/DL
GLOBULIN SER CALC-MCNC: 3.3 G/DL (ref 2–4)
GLUCOSE BLD STRIP.AUTO-MCNC: 283 MG/DL (ref 65–100)
GLUCOSE SERPL-MCNC: 333 MG/DL (ref 65–100)
HBA1C MFR BLD: 7.9 % (ref 4.2–6.3)
HCT VFR BLD AUTO: 39.8 % (ref 36.6–50.3)
HGB BLD-MCNC: 13.8 G/DL (ref 12.1–17)
LYMPHOCYTES # BLD: 1.5 K/UL (ref 0.8–3.5)
LYMPHOCYTES NFR BLD: 16 % (ref 12–49)
MCH RBC QN AUTO: 29.6 PG (ref 26–34)
MCHC RBC AUTO-ENTMCNC: 34.7 G/DL (ref 30–36.5)
MCV RBC AUTO: 85.4 FL (ref 80–99)
MONOCYTES # BLD: 0.1 K/UL (ref 0–1)
MONOCYTES NFR BLD: 1 % (ref 5–13)
NEUTS SEG # BLD: 7.7 K/UL (ref 1.8–8)
NEUTS SEG NFR BLD: 83 % (ref 32–75)
P-R INTERVAL, ECG05: 132 MS
PLATELET # BLD AUTO: 182 K/UL (ref 150–400)
POTASSIUM SERPL-SCNC: 4.5 MMOL/L (ref 3.5–5.1)
PROT SERPL-MCNC: 6.6 G/DL (ref 6.4–8.2)
Q-T INTERVAL, ECG07: 310 MS
QRS DURATION, ECG06: 80 MS
QTC CALCULATION (BEZET), ECG08: 438 MS
RBC # BLD AUTO: 4.66 M/UL (ref 4.1–5.7)
SERVICE CMNT-IMP: ABNORMAL
SODIUM SERPL-SCNC: 141 MMOL/L (ref 136–145)
VENTRICULAR RATE, ECG03: 120 BPM
WBC # BLD AUTO: 9.3 K/UL (ref 4.1–11.1)

## 2017-11-02 PROCEDURE — 80053 COMPREHEN METABOLIC PANEL: CPT | Performed by: INTERNAL MEDICINE

## 2017-11-02 PROCEDURE — 74011250636 HC RX REV CODE- 250/636: Performed by: INTERNAL MEDICINE

## 2017-11-02 PROCEDURE — 74011250637 HC RX REV CODE- 250/637: Performed by: INTERNAL MEDICINE

## 2017-11-02 PROCEDURE — 85025 COMPLETE CBC W/AUTO DIFF WBC: CPT | Performed by: INTERNAL MEDICINE

## 2017-11-02 PROCEDURE — 36415 COLL VENOUS BLD VENIPUNCTURE: CPT | Performed by: INTERNAL MEDICINE

## 2017-11-02 PROCEDURE — 82962 GLUCOSE BLOOD TEST: CPT

## 2017-11-02 RX ORDER — FAMOTIDINE 20 MG/1
20 TABLET, FILM COATED ORAL 2 TIMES DAILY
Status: DISCONTINUED | OUTPATIENT
Start: 2017-11-02 | End: 2017-11-02 | Stop reason: HOSPADM

## 2017-11-02 RX ORDER — EPINEPHRINE 0.3 MG/.3ML
0.3 INJECTION SUBCUTANEOUS
Qty: 1 SYRINGE | Refills: 0 | Status: SHIPPED | OUTPATIENT
Start: 2017-11-02 | End: 2018-06-21 | Stop reason: SDUPTHER

## 2017-11-02 RX ORDER — FAMOTIDINE 20 MG/1
20 TABLET, FILM COATED ORAL 2 TIMES DAILY
Qty: 6 TAB | Refills: 0 | Status: SHIPPED | OUTPATIENT
Start: 2017-11-02 | End: 2017-11-05

## 2017-11-02 RX ORDER — CLINDAMYCIN HYDROCHLORIDE 300 MG/1
300 CAPSULE ORAL 3 TIMES DAILY
Qty: 15 CAP | Refills: 0 | Status: SHIPPED | OUTPATIENT
Start: 2017-11-02 | End: 2017-11-07

## 2017-11-02 RX ORDER — PREDNISONE 20 MG/1
TABLET ORAL
Qty: 12 TAB | Refills: 0 | Status: SHIPPED | OUTPATIENT
Start: 2017-11-02 | End: 2018-05-06

## 2017-11-02 RX ORDER — PREDNISONE 20 MG/1
60 TABLET ORAL
Status: DISCONTINUED | OUTPATIENT
Start: 2017-11-02 | End: 2017-11-02 | Stop reason: HOSPADM

## 2017-11-02 RX ORDER — DIPHENHYDRAMINE HCL 25 MG
25 CAPSULE ORAL
Qty: 30 CAP | Refills: 0 | Status: SHIPPED | OUTPATIENT
Start: 2017-11-02 | End: 2017-11-12

## 2017-11-02 RX ADMIN — Medication 10 ML: at 00:00

## 2017-11-02 RX ADMIN — FAMOTIDINE 20 MG: 20 TABLET, FILM COATED ORAL at 08:40

## 2017-11-02 RX ADMIN — SODIUM CHLORIDE 125 ML/HR: 900 INJECTION, SOLUTION INTRAVENOUS at 06:44

## 2017-11-02 RX ADMIN — Medication 10 ML: at 05:01

## 2017-11-02 RX ADMIN — METHYLPREDNISOLONE SODIUM SUCCINATE 80 MG: 40 INJECTION, POWDER, FOR SOLUTION INTRAMUSCULAR; INTRAVENOUS at 05:01

## 2017-11-02 NOTE — PROGRESS NOTES
[facility]: MED RECONCILIATION      Information obtained from: [facility] pharmacy, patient      Allergies: Augmentin [amoxicillin-pot clavulanate]; Penicillins; and Toradol [ketorolac]    Prior to Admission Medications:     Medication Documentation Review Audit       Reviewed by Josseline Schaffer (Pharmacist) on 11/01/17 at 2107         Medication Sig Documenting Provider Last Dose Status Taking?      amoxicillin-clavulanate (AUGMENTIN) 875-125 mg per tablet Take 1 Tab by mouth every twelve (12) hours. Tiffanie Fink MD 11/1/2017 Unknown time Active Yes    aspirin 81 mg chewable tablet Take 81 mg by mouth daily. Historical Provider  Active Yes             Med Note (Elder Valle. Wed Nov 1, 2017  8:46 PM): Does not take often but is suppose to take daily      atorvastatin (LIPITOR) 40 mg tablet Take 40 mg by mouth daily. Historical Provider 11/1/2017 AM Active Yes    cyclobenzaprine (FLEXERIL) 10 mg tablet Take 10 mg by mouth three (3) times daily as needed for Muscle Spasm(s). Historical Provider  Active Yes             Med Note (Elder Valle. Wed Nov 1, 2017  9:07 PM): Keeps both flexeril and robaxin around in case needed      Fenofibrate 150 mg cap Take 150 mg by mouth daily. Historical Provider 11/1/2017 AM Active Yes    glimepiride (AMARYL) 4 mg tablet Take 4 mg by mouth daily. Historical Provider 11/1/2017 AM Active Yes    metFORMIN (GLUCOPHAGE) 1,000 mg tablet Take 1,000 mg by mouth two (2) times a day. Historical Provider 11/1/2017 AM Active Yes    methocarbamol (ROBAXIN) 500 mg tablet Take 500 mg by mouth daily as needed. Historical Provider  Active Yes             Med Note (Elder Valle. Wed Nov 1, 2017  9:07 PM): Keeps both flexeril and robaxin around in case needed      OTHER,NON-FORMULARY, Centrum Heart MVI daily  Centrum Whole Body MVI daily Historical Provider 11/1/2017 AM Active Yes    traMADol (ULTRAM) 50 mg tablet Take 50 mg by mouth every eight (8) hours as needed for Pain.  Historical Provider  Active Yes    valsartan 320 mg tab 320 mg, hydroCHLOROthiazide 12.5 mg cap 12.5 mg Take 1 Dose by mouth daily. Historical Provider 11/1/2017 AM Active Yes                    Thank you,  Chad Willis, Pharm. D.

## 2017-11-02 NOTE — ED NOTES
TRANSFER - OUT REPORT:    Verbal telephone report given to Denton Donis RN(name) on Chucky Lin  being transferred to ICU 3-008(unit) for routine progression of care       Report consisted of patients Situation, Background, Assessment and   Recommendations(SBAR). Information from the following report(s) SBAR, Kardex, ED Summary, Procedure Summary, MAR, Med Rec Status and Cardiac Rhythm ST was reviewed with the receiving nurse. Lines:   Peripheral IV 11/01/17 Right Hand (Active)       Peripheral IV 11/01/17 Left Antecubital (Active)   Site Assessment Clean, dry, & intact 11/1/2017  8:20 PM   Phlebitis Assessment 0 11/1/2017  8:20 PM   Infiltration Assessment 0 11/1/2017  8:20 PM   Dressing Status Clean, dry, & intact 11/1/2017  8:20 PM   Dressing Type Transparent 11/1/2017  8:20 PM   Hub Color/Line Status Pink 11/1/2017  8:20 PM   Alcohol Cap Used Yes 11/1/2017  8:20 PM        Opportunity for questions and clarification was provided.       Patient transported with:   Monitor  O2 @ 2 liters  Registered Nurse

## 2017-11-02 NOTE — ROUTINE PROCESS
2245-TRANSFER - IN REPORT:Verbal report received from EVA Meraz RN on Chucky Lin  being received from ER for urgent transfer  Report consisted of patients Situation, Background, Assessment and Recommendations(SBAR). Information from the following report(s) SBAR, Kardex, Intake/Output, MAR and Recent Results was reviewed with the receiving nurse. Opportunity for questions and clarification was provided. 0715-Bedside and Verbal shift change report given to Kayce Yun Dr (oncoming nurse) by Tasha Lopez RN (offgoing nurse). Report included the following information SBAR, Kardex, Intake/Output, MAR and Recent Results. Shameka Adkins RN

## 2017-11-02 NOTE — DISCHARGE INSTRUCTIONS
HOSPITALIST DISCHARGE INSTRUCTIONS  NAME: Federico Zelaya   :  1977   MRN:  [id number]     Date/Time:  2017 8:08 AM    ADMIT DATE: 2017     DISCHARGE DATE: 2017     PRINCIPAL DISCHARGE DIAGNOSES:  Severe allergic reaction (anaphylaxis)     MEDICATIONS:  · It is important that you take the medication exactly as they are prescribed. Note the changes and additions to your medications. Be sure you understand these changes before you are discharged today. · Keep your medication in the bottles provided by the pharmacist and keep a list of the medication names, dosages, and times to be taken in your wallet. · Do not take other medications without consulting your doctor. · Do not take Penicillins, Augmentin, Amoxicillin, Toradol and other NSAIDs (nonsteroid anti-inflammatory drugs) such as Motrin, Aleve, Advil, ibuprofen, Naproxen, etc.      Pain Management: per above medications    What to do at Home    Recommended diet:  Cardiac Diet and Diabetic Diet    Recommended activity: Activity as tolerated    If you experience any of the following symptoms then please call your primary care physician or return to the emergency room if you cannot get hold of your doctor:  rash, severe chest pain, shortness of breath, facial or tongue swelling, severe abdominal pain, nausea, vomiting, diarrhea, or other severe concerning symptoms that brought you to the hospital in the first place    Follow Up: Follow-up Information     Follow up With Details Comments Contact Info    follow up with primary care doctor within 3 days       see an allergist for allergy testing       Kimberly Borrero MD Go on 2017 Hospital follow-up appointment at 8:00AM. Please arrive at 7:45AM to complete new patient paperwork. Jeannette  [phone]              Information obtained by :  I understand that if any problems occur once I am at home I am to contact my physician.     I understand and acknowledge receipt of the instructions indicated above.                                                                                                                                            Physician's or R.N.'s Signature                                                                  Date/Time                                                                                                                                              Patient or Representative Signature                                                          Date/Time

## 2017-11-02 NOTE — ED NOTES
Pt vomited large amt green emesis, with undigested food. Dr Juloi Andres at bedside. Verbal order received for IV zofran. Pt HOB up semi fowlers , O2 applied at 2 L NC.  Pt is awake, talking, alert and oriented x 4

## 2017-11-02 NOTE — PROGRESS NOTES
111 [facility] November 2, 2017       RE: Elvin Dave      To Whom It May Concern,    This is to certify that Elvin Dave was hospitalized at [place] [address] from 11/1/2017 to 11/2/2017 and may may return to work on 11/5/2017. Please feel free to contact my office if you have any questions or concerns. Thank you for your assistance in this matter.       Sincerely,  Kylee Noe MD  [phone]

## 2017-11-02 NOTE — ED NOTES
Pt transported from triage in resp distress, facial swelling ; wife at bedside stated pt had just taken an abx for tooth infection, and immediately started itching and c/o SOB. On arrival to room Dr Lieutenant High at bedside, pt placed on cardiac, NBP and pulse ox monitors. Resp therapy at bedside, preparing to place pt on BiPAP, but per Dr Corley Lennox BiPAP on hold.

## 2017-11-02 NOTE — PROGRESS NOTES
Shift Summary    0700: Bedside and Verbal shift change report received from [address] [place] [address] by Emanuel Almazan RN. Report included the following information SBAR, Kardex, ED Summary, Intake/Output, MAR, Accordion, Recent Results and Cardiac Rhythm NSR. Surekha Gray 0800: Dr. Geovanna Baez and Dr. Garrett García agree that pt may be discharged to home today. 1000: I have reviewed discharge instructions with the patient. The patient verbalized understanding.

## 2017-11-02 NOTE — PROGRESS NOTES
Pt seen and examined,  34 yo presented with resp distress after 2 doses of abx - IM and po - po was augmentin  C/o itching lips, swelling, sob  Responded to epi, steroids and is feeling well  3rd episode this year    pmh  htn  Dm  Hyperlipidemia  Angioedema, possible anaphylaxis      VSS  Heart regular   Lungs clear  Heent: missing teeth, no swelling  abd soft nt    Xray clear    IMP: drug rxn, possible angioedema/anaphylaxis  P  PO prednisone taper  Prn benadryl  pepcid for a few days  Change abx to non beta lactam  Consider allergist referral  Should carry epi pen  Ok for dc home from my standpoint

## 2017-11-02 NOTE — PROGRESS NOTES
6[facility] arrived to ICU # 7. Admission Assessment complete. 0000-Initial Shift Assessment Complete. Pt is A & O x 4, VSS on RA Temp 98.6. Pt denies pain at this time. States, \" I feel much better. \" Will continue to monitor. 0030- Friends/Visitors Arrived to visit ICU. Brought Pt food to include Fruit/Coconut Water. Advised he could not have this as it was not on his diet. No Evidence of learning. Pt tolerating PO intake of Ginger ale provided by RN without incidence. Pt verbalized understanding 0400- Reassess- Resting Well. Pt observed to be having apneic periods during his sleep. O2 Sats Reflective. Dropping as low as 75 %. O2 2 L NC Applied to assist. 0500- Pt spoke with Magalis Lowery advised him of situation and that he was in ICU. Post Phone call Pt keeps stating \"I can go home now, I am ok. \" Annalisa Cotteraacs him it was up to MD as to when he could go home. He has to be cleared by  Before he could go that I could not make that decision. He verbalized understanding and wanted to know \" When does the Dr come in?\"     Shift Summary:  VSS   Adequate UO  Good PO Intake    Shameka Adkins RN

## 2017-11-02 NOTE — ED NOTES
Pt was incontinent urine and stool on arrival. Radha care performed , linens changed and pt assisted into hospital gown.

## 2017-11-02 NOTE — H&P
[facility]  [address] [facility] [address]  [phone]    Admission History and Physical      NAME:              Bernadette Crouch   :   1977   MRN:  [id number]     PCP:  None     Date:     2017     Chief  Complaint: Anaphylaxis to Augmentin    History Of Presenting Illness:       Mr. Ronnie Hitchcock is a 35 y.o. male who is being admitted for an anaphylactic reaction. Mr. Ronnie Hitchcock presented to our Emergency Department today complaining of SOB of sudden onset after he took an augmentin pill for dental caries. He says he had gone to [facility] after being sent there by his dentist to be reviewed for dental caries and obtain antibiotic prior to further teeth treatments. He was sent home on Augmentin. He apparently has a hx of allergy to PCNs. He had just had dinner and he took his Augmentin after which he developed sudden onset of SOB and almost went onto respiratory arrest. He was rushed to the ED where he was almost intubated. He was rescusitated and stabilized and he will be admitted for further management. Allergies   Allergen Reactions    Augmentin [Amoxicillin-Pot Clavulanate] Anaphylaxis    Penicillins Anaphylaxis    Toradol [Ketorolac] Anaphylaxis       Prior to Admission medications    Medication Sig Start Date End Date Taking? Authorizing Provider   amoxicillin-clavulanate (AUGMENTIN) 875-125 mg per tablet Take 1 Tab by mouth every twelve (12) hours. Yes Phys MD Danae   metFORMIN (GLUCOPHAGE) 1,000 mg tablet Take 1,000 mg by mouth two (2) times a day. Yes Historical Provider   aspirin 81 mg chewable tablet Take 81 mg by mouth daily. Yes Historical Provider   atorvastatin (LIPITOR) 40 mg tablet Take 40 mg by mouth daily. Yes Historical Provider   glimepiride (AMARYL) 4 mg tablet Take 4 mg by mouth daily.    Yes Historical Provider valsartan 320 mg tab 320 mg, hydroCHLOROthiazide 12.5 mg cap 12.5 mg Take 1 Dose by mouth daily. Yes Historical Provider   Fenofibrate 150 mg cap Take 150 mg by mouth daily. Yes Historical Provider   methocarbamol (ROBAXIN) 500 mg tablet Take 500 mg by mouth daily as needed. Yes Historical Provider   cyclobenzaprine (FLEXERIL) 10 mg tablet Take 10 mg by mouth three (3) times daily as needed for Muscle Spasm(s). Yes Historical Provider   traMADol (ULTRAM) 50 mg tablet Take 50 mg by mouth every eight (8) hours as needed for Pain. Yes Historical Provider   Butch Garcia, Centrum Heart MVI daily  Centrum Whole Body MVI daily   Yes Historical Provider       Past Medical History:   Diagnosis Date    DM type 2 (diabetes mellitus, type 2) ([facility] [address] [facility]     HTN (hypertension), benign         History reviewed. No pertinent surgical history. Social History   Substance Use Topics    Smoking status: Never Smoker    Smokeless tobacco: Never Used    Alcohol use No        Family History   Problem Relation Age of Onset    Hypertension Neg Hx     Diabetes Neg Hx         Review of Systems:    Constitutional ROS: no fever, chills, rigors or night sweats  Respiratory ROS: no cough, sputum, hemoptysis but mild dyspnea. No pleuritic pain. Cardiovascular ROS: no chest pain, palpitations, orthopnea, PND or syncope  Endocrine ROS: no polydispsia, polyuria, heat or cold intolerance or major weight change.   Gastrointestinal ROS: no dysphagia, abdominal pain, nausea, vomiting or diarrhea    Genito-Urinary ROS: no dysuria, frequency, hematuria, retention or flank pain  Musculoskeletal ROS: no joint pain, swelling or muscular tenderness  Neurological ROS: no headache, confusion, focal weakness or any other neurological symptoms  Psychiatric ROS: no depression, anxiety, mood swings  Dermatological ROS: no rash, pruritis, or urticaria  Heme-Lymph ROS: no swollen glands, bleeding    Examination:    Constitutional:    Visit Vitals    /77    Pulse (!) 107    Resp 23    SpO2 98%       General:  Weak and ill looking patient in no acute distress  Eyes: Pink conjunctivae, PERRLA with no discharge. Normal eye movements  Ear, Nose, Mouth & Throat: No ottorrhea, rhinorrhea, non tender sinuses, dry mucous membranes, swollen lips  Respiratory:  No accessory muscle use, decreased breath sounds without crackles or wheezes  Cardiovascular:  No JVD or murmurs, sinus tachycardia, without thrills, bruits or peripheral edema. GI & :  Soft abdomen, non-tender, non-distended, normoactive bowel sounds with no palpable organomegaly  Heme:  No cervical or axillary adenopathy. Musculoskeletal:  No cyanosis, clubbing, atrophy or deformities  Skin:  No rashes, bruising or ulcers   Neurological: Awake and alert, speech is clear, CNs 2-12 are grossly intact and otherwise non focal  Psychiatric:  Has a good insight and is oriented x 3  ________________________________________________________________________    Data Review:    Labs:    Recent Labs      11/01/17 1942   WBC  11.8*   HGB  17.0   HCT  48.3   PLT  222     Recent Labs      11/01/17 1942   NA  142   K  3.6   CL  104   CO2  23   GLU  262*   BUN  17   CREA  1.54*   CA  9.8   ALB  3.7   SGOT  25   ALT  36     No components found for: GLPOC  No results for input(s): PH, PCO2, PO2, HCO3, FIO2 in the last 72 hours. No results for input(s): INR in the last 72 hours. No lab exists for component: INREXT    I have also reviewed available old medical records. Assessment & Impression:     Mr. Lizzy Christianson is a 35 y.o. male being evaluated for:     Principal Problem:    Anaphylactic reaction (11/1/2017)    Active Problems:    Type 2 diabetes mellitus without complication ([facility] [address] (37/2/5728)      Dental caries (11/1/2017)      HTN (hypertension), benign (11/1/2017)         Plan of management:    Anaphylactic reaction (11/1/2017): Allergic to Augmentin. Admit to ICU for close monitoring.  Start IV solumedrol, IV Benadryl, IV pepcid. Albuterol as needed. Epinephrine as needed. Oxygen support as needed. [facility] mellitus without complication [facility] (69/6/9383): check A1c. Monitor blood glucose. Hold Metformin. SSI per protocol for now. Clear liquid diet for now. Dental caries (11/1/2017): Pain control. No Toradol. Will need antibiotics at discharge. Follow up with his dentist    HTN (hypertension), benign (11/1/2017): BP stable.  Monitor     Hyperlipidemia: hold home meds    GUILLERMINA: hydrate and follow renal function    Code Status:  Full    Surrogate decision maker: Family    Risk of deterioration: high      Total time spent for the care of the patient: [facility] with: Patient, Family, Nursing Staff and ED physician    Discussed:  Code Status, Care Plan and D/C Planning    Prophylaxis:  Lovenox and H2B/PPI    Probable Disposition:  Home w/Family           ___________________________________________________    Attending Physician: Debbie Damico MD

## 2017-11-02 NOTE — DISCHARGE SUMMARY
Physician Discharge Summary     Patient ID:  Luis F Wisdom  [id number]  55 y.o.  12/21/1977    Admit date: 11/1/2017    Discharge date: 11/2/2017    Admission Diagnoses: Anaphylactic reaction    Principal Discharge Diagnoses:    Anaphylaxis     OTHER PROBLEMS ADDRESSEDS  Principal Diagnosis Anaphylactic reaction                                            Principal Problem:    Anaphylactic reaction (11/1/2017)    Active Problems:    Type 2 diabetes mellitus without complication ([facility] [address] (22/4/1334)      Dental caries (11/1/2017)      HTN (hypertension), benign (11/1/2017)      Hyperlipidemia (11/1/2017)       Patient Active Problem List   Diagnosis Code    Anaphylactic reaction T78. 2XXA    [facility] N12.1    Dental caries K02.9    HTN (hypertension), benign I10    Hyperlipidemia E78.5         Hospital Course:     Anaphylactic reaction (11/1/2017): severe allergic reaction to Augmentin. Discussed in detail to avoid penicillins (PCN, amoxicillin, Augmentin). Should probably avoid cephalosporins for now until he sees an allergist and gets further testing. Doing well now and back to baseline. Intensivist recommends steroid taper, H2B, H1B PRN, and epi-pen. Okay to discharge today     Type 2 diabetes mellitus without complication ([facility] [address] (22/6/7136): A1c pending. Can continue metformin but watch Cr closely as his GFR is right around 45. Discussed with pt. Continue glipizide. Will need close PCP follow up. CM to arrange     Dental caries (11/1/2017): Pain control. No Toradol or other NSAIDS. Written and verbal directions provided. Will d/c on clindamycin. Follow up with dentist     HTN (hypertension), benign (11/1/2017): BP stable. Monitor. Stay off antihypertensives for now given borderline BPs     Hyperlipidemia: can resume home meds     GUILLERMINA: more likely CKD3. Watch renal function. Avoid NSAIDs and other nephrotoxins. Watch closely on metformin.  PCP follow up    Pt discharged in improved and stable condition. Procedures performed: none    Imaging studies:   CXR - Shallow volumes but clear lungs. PCP: None    Consults: Pulmonary/Intensive care    Discharge Exam:  Patient Vitals for the past 12 hrs:   Temp Pulse Resp BP SpO2   11/02/17 0800 - 89 24 113/61 93 %   11/02/17 0700 - 87 21 110/66 92 %   11/02/17 0600 - 81 15 102/64 98 %   11/02/17 0500 - 99 23 (!) 89/58 97 %   11/02/17 0400 97.9 °F (36.6 °C) 81 19 (!) 87/33 93 %   11/02/17 0300 - 96 22 91/51 92 %   11/02/17 0200 - 94 21 91/58 (!) 88 %   11/02/17 0100 - 95 25 97/60 94 %   11/02/17 0000 - 97 23 101/62 95 %   11/01/17 2312 98.6 °F (37 °C) 100 22 115/73 96 %   11/01/17 2230 - 90 20 101/53 92 %   11/01/17 2215 - 90 20 - 95 %   11/01/17 2200 - 92 20 102/51 95 %   11/01/17 2145 - 97 21 109/62 96 %   11/01/17 2130 - 99 19 108/61 93 %   11/01/17 2115 - (!) 104 24 114/66 95 %   11/01/17 2100 - (!) 105 25 115/70 94 %       GEN: NAD  HEENT: no facial or tongue swelling, poor dentition but no exudates, bleeding noted  CV: RRR, no MRG  RESP: CTAB      Disposition: home    Patient Instructions:   Current Discharge Medication List      START taking these medications    Details   famotidine (PEPCID) 20 mg tablet Take 1 Tab by mouth two (2) times a day for 3 days. Qty: 6 Tab, Refills: 0      predniSONE (DELTASONE) 20 mg tablet 3 tabs (60 mg) daily x 2 days, then  2 tabs (40 mg) daily x 2 days, then  1 tabs (20 mg) daily x 2 days  Qty: 12 Tab, Refills: 0      EPINEPHrine (EPIPEN) 0.3 mg/0.3 mL injection 0.3 mL by IntraMUSCular route once as needed for up to 1 dose. Qty: 1 Syringe, Refills: 0      clindamycin (CLEOCIN) 300 mg capsule Take 1 Cap by mouth three (3) times daily for 5 days. Qty: 15 Cap, Refills: 0      diphenhydrAMINE (BENADRYL) 25 mg capsule Take 1 Cap by mouth every six (6) hours as needed for up to 10 days.   Qty: 30 Cap, Refills: 0         CONTINUE these medications which have NOT CHANGED    Details   metFORMIN (GLUCOPHAGE) 1,000 mg tablet Take 1,000 mg by mouth two (2) times a day. aspirin 81 mg chewable tablet Take 81 mg by mouth daily. atorvastatin (LIPITOR) 40 mg tablet Take 40 mg by mouth daily. glimepiride (AMARYL) 4 mg tablet Take 4 mg by mouth daily. Fenofibrate 150 mg cap Take 150 mg by mouth daily. methocarbamol (ROBAXIN) 500 mg tablet Take 500 mg by mouth daily as needed. cyclobenzaprine (FLEXERIL) 10 mg tablet Take 10 mg by mouth three (3) times daily as needed for Muscle Spasm(s). traMADol (ULTRAM) 50 mg tablet Take 50 mg by mouth every eight (8) hours as needed for Pain. OTHER,NON-FORMULARY, Centrum Heart MVI daily  Centrum Whole Body MVI daily         STOP taking these medications       amoxicillin-clavulanate (AUGMENTIN) 875-125 mg per tablet Comments:   Reason for Stopping:         valsartan 320 mg tab 320 mg, hydroCHLOROthiazide 12.5 mg cap 12.5 mg Comments:   Reason for Stopping:               Activity: See discharge instructions  Diet: See discharge instructions  Wound Care: See discharge instructions    Follow-up Information     Follow up With Details Comments Contact Info    follow up with primary care doctor within 3 days       see an allergist for allergy testing       Tom Davidson MD Go on 11/6/2017 Hospital follow-up appointment at 8:00AM. Please arrive at 7:45AM to complete new patient paperwork. [place]  [phone]            I spent 35 minutes on this discharge.     Signed:  Joe Patterson MD  11/2/2017  8:12 AM

## 2017-11-02 NOTE — ROUTINE PROCESS
Spoke with pt about establishing PCP. Pt indicated [address] [facility] is the most convenient location. New pt hospital f/u appointment scheduled.  Anna Randle CM Specialist

## 2017-11-02 NOTE — PROGRESS NOTES
I met with pt in conjunction with attending. Pt is being discharged. He will need a work excuse for [employer]. Dr Autumn Gutierrez informed and has given pt work excuse. Rod Mccauley His wife will be picking pt up as his car is parked on [address]. Case Management specialist is obtaining an appointment for pt @ [facility]. She will be placing this on AVS.I have requested for AVS not to be printed out until appointment is on the AVS.  I gave pt the number for billing as pt forgot to bring in his insurance card or gave him the option to return to registration on the second floor. Pt does not meet criteria for home health or rehab.   Sunita Toro [employer]  [phone]

## 2017-11-03 LAB
BACTERIA SPEC CULT: NORMAL
BACTERIA SPEC CULT: NORMAL
SERVICE CMNT-IMP: NORMAL

## 2017-11-14 ENCOUNTER — OFFICE VISIT (OUTPATIENT)
Dept: FAMILY MEDICINE CLINIC | Age: 42
End: 2017-11-14

## 2017-11-14 VITALS
HEART RATE: 82 BPM | RESPIRATION RATE: 18 BRPM | TEMPERATURE: 98.4 F | HEIGHT: 68 IN | SYSTOLIC BLOOD PRESSURE: 132 MMHG | WEIGHT: 256.4 LBS | BODY MASS INDEX: 38.86 KG/M2 | DIASTOLIC BLOOD PRESSURE: 84 MMHG | OXYGEN SATURATION: 98 %

## 2017-11-14 DIAGNOSIS — B35.6 TINEA CRURIS: ICD-10-CM

## 2017-11-14 DIAGNOSIS — N18.30 CKD (CHRONIC KIDNEY DISEASE) STAGE 3, GFR 30-59 ML/MIN (HCC): ICD-10-CM

## 2017-11-14 DIAGNOSIS — E78.00 HIGH CHOLESTEROL: ICD-10-CM

## 2017-11-14 DIAGNOSIS — N52.1 ERECTILE DYSFUNCTION DUE TO DISEASES CLASSIFIED ELSEWHERE: ICD-10-CM

## 2017-11-14 DIAGNOSIS — K04.7 DENTAL INFECTION: ICD-10-CM

## 2017-11-14 DIAGNOSIS — E11.9 TYPE 2 DIABETES MELLITUS WITHOUT COMPLICATION, WITHOUT LONG-TERM CURRENT USE OF INSULIN (HCC): Primary | ICD-10-CM

## 2017-11-14 DIAGNOSIS — Z23 ENCOUNTER FOR IMMUNIZATION: ICD-10-CM

## 2017-11-14 RX ORDER — METFORMIN HYDROCHLORIDE 1000 MG/1
1000 TABLET ORAL 2 TIMES DAILY WITH MEALS
Qty: 180 TAB | Refills: 1 | Status: SHIPPED | OUTPATIENT
Start: 2017-11-14 | End: 2018-05-31 | Stop reason: SDUPTHER

## 2017-11-14 RX ORDER — MAG HYDROX/ALUMINUM HYD/SIMETH 400-400-40
SUSPENSION, ORAL (FINAL DOSE FORM) ORAL
COMMUNITY

## 2017-11-14 RX ORDER — SILDENAFIL 25 MG/1
25 TABLET, FILM COATED ORAL AS NEEDED
Qty: 20 TAB | Refills: 3 | Status: SHIPPED | OUTPATIENT
Start: 2017-11-14

## 2017-11-14 RX ORDER — CHLORPHENIRAMINE MALEATE 4 MG
TABLET ORAL 2 TIMES DAILY
Qty: 15 G | Refills: 3 | Status: SHIPPED | OUTPATIENT
Start: 2017-11-14 | End: 2018-05-12 | Stop reason: SDUPTHER

## 2017-11-14 RX ORDER — CLINDAMYCIN HYDROCHLORIDE 300 MG/1
300 CAPSULE ORAL 3 TIMES DAILY
Qty: 15 CAP | Refills: 0 | Status: SHIPPED | OUTPATIENT
Start: 2017-11-14 | End: 2017-11-19

## 2017-11-14 RX ORDER — GLIMEPIRIDE 4 MG/1
4 TABLET ORAL
Qty: 90 TAB | Refills: 1 | Status: SHIPPED | OUTPATIENT
Start: 2017-11-14 | End: 2018-05-27 | Stop reason: SDUPTHER

## 2017-11-14 RX ORDER — VALSARTAN 40 MG/1
40 TABLET ORAL DAILY
Qty: 90 TAB | Refills: 3 | Status: SHIPPED | OUTPATIENT
Start: 2017-11-14 | End: 2018-05-22 | Stop reason: SDUPTHER

## 2017-11-14 RX ORDER — ATORVASTATIN CALCIUM 40 MG/1
40 TABLET, FILM COATED ORAL DAILY
Qty: 90 TAB | Refills: 1 | Status: SHIPPED | OUTPATIENT
Start: 2017-11-14 | End: 2018-05-22 | Stop reason: SDUPTHER

## 2017-11-14 NOTE — LETTER
NOTIFICATION RETURN TO WORK / SCHOOL 
 
11/14/2017 3:56 PM 
 
Mr. Joshua Patrick 
[address] [id number] To Whom It May Concern: 
 
Joshua Patrick is currently under the care of [facility] on 11/14/17. He will return to work/school on: 11/15/17 If there are questions or concerns please have the patient contact our office.  
 
 
 
Sincerely, 
 
 
Ana Rosa Quiroz MD

## 2017-11-14 NOTE — PROGRESS NOTES
Chief Complaint   Patient presents with    Transitions Of Care     hospitalized for allergic reaction to penicillin

## 2017-11-14 NOTE — PATIENT INSTRUCTIONS
- Please take your diabetes medications    - Valsartan was sent to your pharmacy for your kidneys     - Your blood pressure is at goal to day. Remember the goal is < 140/90. Please check your blood pressures to make sure you stay at goal.    - Continue working on diet and exercise for your diabetes. - Please see a dentist for your tooth infection.    - Clotrimazole cream was sent for your rash    - Viagra sent to your pharmacy as well    Learning About Diabetes Food Guidelines  Your Care Instructions    Meal planning is important to manage diabetes. It helps keep your blood sugar at a target level (which you set with your doctor). You don't have to eat special foods. You can eat what your family eats, including sweets once in a while. But you do have to pay attention to how often you eat and how much you eat of certain foods. You may want to work with a dietitian or a certified diabetes educator (CDE) to help you plan meals and snacks. A dietitian or CDE can also help you lose weight if that is one of your goals. What should you know about eating carbs? Managing the amount of carbohydrate (carbs) you eat is an important part of healthy meals when you have diabetes. Carbohydrate is found in many foods. · Learn which foods have carbs. And learn the amounts of carbs in different foods. ¨ Bread, cereal, pasta, and rice have about 15 grams of carbs in a serving. A serving is 1 slice of bread (1 ounce), ½ cup of cooked cereal, or 1/3 cup of cooked pasta or rice. ¨ Fruits have 15 grams of carbs in a serving. A serving is 1 small fresh fruit, such as an apple or orange; ½ of a banana; ½ cup of cooked or canned fruit; ½ cup of fruit juice; 1 cup of melon or raspberries; or 2 tablespoons of dried fruit. ¨ Milk and no-sugar-added yogurt have 15 grams of carbs in a serving. A serving is 1 cup of milk or 2/3 cup of no-sugar-added yogurt. ¨ Starchy vegetables have 15 grams of carbs in a serving.  A serving is ½ cup of mashed potatoes or sweet potato; 1 cup winter squash; ½ of a small baked potato; ½ cup of cooked beans; or ½ cup cooked corn or green peas. · Learn how much carbs to eat each day and at each meal. A dietitian or CDE can teach you how to keep track of the amount of carbs you eat. This is called carbohydrate counting. · If you are not sure how to count carbohydrate grams, use the Plate Method to plan meals. It is a good, quick way to make sure that you have a balanced meal. It also helps you spread carbs throughout the day. ¨ Divide your plate by types of foods. Put non-starchy vegetables on half the plate, meat or other protein food on one-quarter of the plate, and a grain or starchy vegetable in the final quarter of the plate. To this you can add a small piece of fruit and 1 cup of milk or yogurt, depending on how many carbs you are supposed to eat at a meal.  · Try to eat about the same amount of carbs at each meal. Do not \"save up\" your daily allowance of carbs to eat at one meal.  · Proteins have very little or no carbs per serving. Examples of proteins are beef, chicken, turkey, fish, eggs, tofu, cheese, cottage cheese, and peanut butter. A serving size of meat is 3 ounces, which is about the size of a deck of cards. Examples of meat substitute serving sizes (equal to 1 ounce of meat) are 1/4 cup of cottage cheese, 1 egg, 1 tablespoon of peanut butter, and ½ cup of tofu. How can you eat out and still eat healthy? · Learn to estimate the serving sizes of foods that have carbohydrate. If you measure food at home, it will be easier to estimate the amount in a serving of restaurant food. · If the meal you order has too much carbohydrate (such as potatoes, corn, or baked beans), ask to have a low-carbohydrate food instead. Ask for a salad or green vegetables. · If you use insulin, check your blood sugar before and after eating out to help you plan how much to eat in the future.   · If you eat more carbohydrate at a meal than you had planned, take a walk or do other exercise. This will help lower your blood sugar. What else should you know? · Limit saturated fat, such as the fat from meat and dairy products. This is a healthy choice because people who have diabetes are at higher risk of heart disease. So choose lean cuts of meat and nonfat or low-fat dairy products. Use olive or canola oil instead of butter or shortening when cooking. · Don't skip meals. Your blood sugar may drop too low if you skip meals and take insulin or certain medicines for diabetes. · Check with your doctor before you drink alcohol. Alcohol can cause your blood sugar to drop too low. Alcohol can also cause a bad reaction if you take certain diabetes medicines. Follow-up care is a key part of your treatment and safety. Be sure to make and go to all appointments, and call your doctor if you are having problems. It's also a good idea to know your test results and keep a list of the medicines you take. Where can you learn more? Go to http://rick-adrianna.info/. Enter L263 in the search box to learn more about \"Learning About Diabetes Food Guidelines. \"  Current as of: March 13, 2017  Content Version: 11.4  © 9820-2630 [employer]. Care instructions adapted under license by [employer] (which disclaims liability or warranty for this information). If you have questions about a medical condition or this instruction, always ask your healthcare professional. [place] [address] any warranty or liability for your use of this information.

## 2017-11-14 NOTE — PROGRESS NOTES
[address]  [address] [place] [address]  [phone]             Date of visit: 2017   Subjective:      History obtained from:  the patient. Huey Kunz is a 42 y.o. male who presents today for transitional care. he was discharged from [facility] facility on 17. Post-discharge telephone contact was NOT made within 2 business days by our nurse navigator. I have done his medication reconciliation. PATIENT HAS DUPLICATE CHART: LORENA Samuel ( 75)    Per chart review:   - Hospitalized  - 17 for anaphylactic reaction to Augmentin after being treated for a tooth infection.  - Had throat swelling, sweats and passed out    - T2DM - Taking Metformin, GFR 45, A1c 7.9%  - BPs controlled off of medications. DC'd Valsartan during hospitalization  - He now has an epipen  - Treated for dental infection for 5 days with Clindamycinclin    Since hospitalization, his breathing feels normal and no other reactions. For his DM, he admits to not taking his Metformin regularly and not monitoring his diet. Complains of erectile dysfunction. No dysuria, hematuria, discharge. Does not monitor home BPs. Recently got insurance. Requesting refills on all of his medications. There are no active problems to display for this patient. Current Outpatient Prescriptions   Medication Sig Dispense Refill    saw palmetto fruit 450 mg cap Take  by mouth.  mv-mn-iron-FA-vit K-ginseng (CENTRUM SPECIALIST ENERGY) 18 mg iron-400 mcg-25 mcg-75mg tab Take  by mouth.  Mv,Ca,Iron,Min-FA-Phytosterol (CENTRUM SPECIALIST HEART) 3-200-400 mg-mcg-mg tab Take  by mouth.  metFORMIN (GLUCOPHAGE) 1,000 mg tablet Take 1 Tab by mouth two (2) times daily (with meals). 180 Tab 1    glimepiride (AMARYL) 4 mg tablet Take 1 Tab by mouth every morning. 90 Tab 1    atorvastatin (LIPITOR) 40 mg tablet Take 1 Tab by mouth daily.  90 Tab 1    valsartan (DIOVAN) 40 mg tablet Take 1 Tab by mouth daily. 90 Tab 3    clotrimazole (LOTRIMIN) 1 % topical cream Apply  to affected area two (2) times a day. 15 g 3    clindamycin (CLEOCIN) 300 mg capsule Take 1 Cap by mouth three (3) times daily for 5 days. 15 Cap 0    sildenafil citrate (VIAGRA) 25 mg tablet Take 1 Tab by mouth as needed. 20 Tab 3    Fenofibrate Nanocrystallized 160 mg tab Si po every day, cancel the capsule order due to the cost 90 Tab 1     Allergies   Allergen Reactions    Augmentin [Amoxicillin-Pot Clavulanate] Anaphylaxis    Penicillins Shortness of Breath     Past Medical History:   Diagnosis Date    Diabetes ([facility] [address]     Hypercholesterolemia     Hypertension      History reviewed. No pertinent surgical history. Family History   Problem Relation Age of Onset    No Known Problems Mother     No Known Problems Father      Social History   Substance Use Topics    Smoking status: Never Smoker    Smokeless tobacco: Never Used    Alcohol use Not on file      Social History     Social History Narrative        Review of Systems  Denies fever, chills, sweats  Denies chest pain, MAYER, palpitations, LE edema  Denies cough, sputum production, SOB, pleuritic chest pain, wheezing    Objective:     Vitals:    17 1459   BP: 132/84   Pulse: 82   Resp: 18   Temp: 98.4 °F (36.9 °C)   TempSrc: Oral   SpO2: 98%   Weight: 256 lb 6.4 oz (116.3 kg)   Height: 5' 7.91\" (1.725 m)     Body mass index is 39.09 kg/(m^2). General: NAD, cooperative, alert, obese  Head: Atraumatic  CV: RRR. No murmur, rubs, gallps  Lung: CTAB. No wheezing, rhonchi. Neuro: No focal deficits    Assessment/Plan:       ICD-10-CM ICD-9-CM    1. Type 2 diabetes mellitus without complication, without long-term current use of insulin (HCC) E11.9 250.00 metFORMIN (GLUCOPHAGE) 1,000 mg tablet      glimepiride (AMARYL) 4 mg tablet   2. High cholesterol E78.00 272.0 atorvastatin (LIPITOR) 40 mg tablet   3.  CKD (chronic kidney disease) stage 3, GFR 30-59 ml/min N18.3 585.3 valsartan (DIOVAN) 40 mg tablet   4. Tinea cruris B35.6 110.3 clotrimazole (LOTRIMIN) 1 % topical cream   5. Dental infection K04.7 522.4 clindamycin (CLEOCIN) 300 mg capsule   6. Erectile dysfunction due to diseases classified elsewhere N52.1 607.84 sildenafil citrate (VIAGRA) 25 mg tablet       Orders Placed This Encounter    saw palmetto fruit 450 mg cap    mv-mn-iron-FA-vit K-ginseng (CENTRUM SPECIALIST ENERGY) 18 mg iron-400 mcg-25 mcg-75mg tab    Mv,Ca,Iron,Min-FA-Phytosterol (CENTRUM SPECIALIST HEART) 3-200-400 mg-mcg-mg tab    metFORMIN (GLUCOPHAGE) 1,000 mg tablet    glimepiride (AMARYL) 4 mg tablet    atorvastatin (LIPITOR) 40 mg tablet    valsartan (DIOVAN) 40 mg tablet    clotrimazole (LOTRIMIN) 1 % topical cream    clindamycin (CLEOCIN) 300 mg capsule    sildenafil citrate (VIAGRA) 25 mg tablet     - Stopped Valsartan-HCTZ  - Start Valsartan at lower dose for CKD  - Continue Metformin and Amaryl  - Re-order Clindamycin for 5 day course for tooth infection. Plans to see dentist in next 1-2 wks  - Clotrimazole cream for rash  - Viagra PRN for ED    Follow up in 3 months for labs and diabetes follow up    Discussed the diagnosis and plan and he expressed understanding. Follow-up Disposition:  Return in about 3 months (around 2/14/2018) for Diabetes follow up.     Gonzalez Kinney MD

## 2017-11-14 NOTE — MR AVS SNAPSHOT
Visit Information Karen Jones Personal Médico Departamento Teléfono del Dep. Número de visita 11/14/2017  3:00 PM Mathew Sow, [address] [facility] [phone] [phone] Follow-up Instructions Return in about 3 months (around 2/14/2018) for Diabetes follow up. Upcoming Health Maintenance Date Due DTaP/Tdap/Td series (1 - Tdap) 12/21/1995 Influenza Age 5 to Adult 8/1/2017 Alergias  Review Complete El: 11/14/2017 Por: MD Mirela Lieberman del:  11/14/2017 Intensidad Anotado Tipo de reacción [employer] Augmentin [Amoxicillin-pot Clavulanate] High 11/14/2017    Anaphylaxis Penicillins  11/14/2017    Shortness of Breath Vacunas actuales Sherren Ala No hay ninguna vacuna archivada. No revisadas esta visita You Were Diagnosed With   
  
 Kelsey Freitas Type 2 diabetes mellitus without complication, without long-term current use of insulin (HCC)    -  Primary ICD-10-CM: E11.9 ICD-9-CM: 250.00 High cholesterol     ICD-10-CM: E78.00 ICD-9-CM: 272.0 CKD (chronic kidney disease) stage 3, GFR 30-59 ml/min     ICD-10-CM: N18.3 ICD-9-CM: 489. 3 Tinea cruris     ICD-10-CM: B35.6 ICD-9-CM: 110.3 Dental infection     ICD-10-CM: K04.7 ICD-9-CM: 522.4 Erectile dysfunction due to diseases classified elsewhere     ICD-10-CM: N52.1 ICD-9-CM: 607.84 Partes vitales PS Pulso Temperatura Resp [place] ( percentil de crecimiento) Peso (percentil de crecimiento) 132/84 (BP 1 Location: Left arm, BP Patient Position: Sitting) 82 98.4 °F (36.9 °C) (Oral) 18 5' 7.91\" (1.725 m) 256 lb 6.4 oz (116.3 kg) SpO2 BMI (IMC) Estatus de tabaquísmo 98% 39.09 kg/m2 Never Smoker Historial de signos vitales BMI and BSA Data Body Mass Index Body Surface Area 39.09 kg/m 2 2.36 m 2 [facility] Pharmacy Name Phone [facility]/PHARMACY #[id number] [place] [address], [address] [facility] [phone] Ackerman lista de medicamentos actualizada Lista actualizada el: 17  3:55 PM.  Swetha Caldwelltamara use ackerman lista de medicamentos más reciente. atorvastatin 40 mg tablet También conocido clarence:  LIPITOR Take 1 Tab by mouth daily. CENTRUM SPECIALIST ENERGY 18 mg iron-400 mcg-25 mcg-75mg Tab Medicamento genérico:  mv-mn-iron-FA-vit K-ginseng Take  by mouth. CENTRUM SPECIALIST HEART 3-200-400 mg-mcg-mg Tab Medicamento genérico:  Mv,Ca,Iron,Min-FA-Phytosterol Take  by mouth. clindamycin 300 mg capsule También conocido clarence:  CLEOCIN Take 1 Cap by mouth three (3) times daily for 5 days. clotrimazole 1 % topical cream  
También conocido clarence:  Sowmya December Apply  to affected area two (2) times a day. Fenofibrate Nanocrystallized 160 mg Tab Si po every day, cancel the capsule order due to the cost  
  
 glimepiride 4 mg tablet También conocido clarence:  AMARYL Take 1 Tab by mouth every morning. metFORMIN 1,000 mg tablet También conocido clarence:  GLUCOPHAGE Take 1 Tab by mouth two (2) times daily (with meals). saw palmetto fruit 450 mg Cap Take  by mouth.  
  
 sildenafil citrate 25 mg tablet También conocido clarence:  VIAGRA Take 1 Tab by mouth as needed. valsartan 40 mg tablet También conocido clarence:  DIOVAN Take 1 Tab by mouth daily. Recetas Enviado a la [place] Refills  
 metFORMIN (GLUCOPHAGE) 1,000 mg tablet 1 Sig: Take 1 Tab by mouth two (2) times daily (with meals). Class: Normal  
 Pharmacy: [facility]/pharmacy [address], [address] [place] [address] #: [phone] Route: Oral  
 glimepiride (AMARYL) 4 mg tablet 1 Sig: Take 1 Tab by mouth every morning.   
 Class: Normal  
 Pharmacy: [address] [facility], [address] Ph #: [phone] Route: Oral  
 atorvastatin (LIPITOR) 40 mg tablet 1 Sig: Take 1 Tab by mouth daily. Class: Normal  
 [facility] [address] [place] [address] Ph #: [phone] Route: Oral  
 valsartan (DIOVAN) 40 mg tablet 3 Sig: Take 1 Tab by mouth daily. Class: Normal  
 Pharmacy: [address] Ph #: [phone] Route: Oral  
 clotrimazole (LOTRIMIN) 1 % topical cream 3 Sig: Apply  to affected area two (2) times a day. Class: Normal  
 [facility] [address] [place] [address] Ph #: [phone] Route: Topical  
 clindamycin (CLEOCIN) 300 mg capsule 0 Sig: Take 1 Cap by mouth three (3) times daily for 5 days. Class: Normal  
 [facility] [address] [place] [address] Ph #: [phone] Route: Oral  
 sildenafil citrate (VIAGRA) 25 mg tablet 3 Sig: Take 1 Tab by mouth as needed. Class: Normal  
 Pharmacy: [address] Ph #: [phone] Route: Oral  
  
Instrucciones de seguimiento Return in about 3 months (around 2/14/2018) for Diabetes follow up. Instrucciones para el Paciente - Please take your diabetes medications - Valsartan was sent to your pharmacy for your kidneys - Your blood pressure is at goal to day. Remember the goal is < 140/90. Please check your blood pressures to make sure you stay at goal. 
 
- Continue working on diet and exercise for your diabetes. - Please see a dentist for your tooth infection. 
 
- Clotrimazole cream was sent for your rash 
 
- Viagra sent to your pharmacy as well Learning About Diabetes Food Guidelines Your Care Instructions Meal planning is important to manage diabetes. It helps keep your blood sugar at a target level (which you set with your doctor). You don't have to eat special foods. You can eat what your family eats, including sweets once in a while. But you do have to pay attention to how often you eat and how much you eat of certain foods. You may want to work with a dietitian or a certified diabetes educator (CDE) to help you plan meals and snacks. A dietitian or CDE can also help you lose weight if that is one of your goals. What should you know about eating carbs? Managing the amount of carbohydrate (carbs) you eat is an important part of healthy meals when you have diabetes. Carbohydrate is found in many foods. · Learn which foods have carbs. And learn the amounts of carbs in different foods. ¨ Bread, cereal, pasta, and rice have about 15 grams of carbs in a serving. A serving is 1 slice of bread (1 ounce), ½ cup of cooked cereal, or 1/3 cup of cooked pasta or rice. ¨ Fruits have 15 grams of carbs in a serving. A serving is 1 small fresh fruit, such as an apple or orange; ½ of a banana; ½ cup of cooked or canned fruit; ½ cup of fruit juice; 1 cup of melon or raspberries; or 2 tablespoons of dried fruit. ¨ Milk and no-sugar-added yogurt have 15 grams of carbs in a serving. A serving is 1 cup of milk or 2/3 cup of no-sugar-added yogurt. ¨ Starchy vegetables have 15 grams of carbs in a serving. A serving is ½ cup of mashed potatoes or sweet potato; 1 cup winter squash; ½ of a small baked potato; ½ cup of cooked beans; or ½ cup cooked corn or green peas. · Learn how much carbs to eat each day and at each meal. A dietitian or CDE can teach you how to keep track of the amount of carbs you eat. This is called carbohydrate counting. · If you are not sure how to count carbohydrate grams, use the Plate Method to plan meals.  It is a good, quick way to make sure that you have a balanced meal. It also helps you spread carbs throughout the day. ¨ Divide your plate by types of foods. Put non-starchy vegetables on half the plate, meat or other protein food on one-quarter of the plate, and a grain or starchy vegetable in the final quarter of the plate. To this you can add a small piece of fruit and 1 cup of milk or yogurt, depending on how many carbs you are supposed to eat at a meal. 
· Try to eat about the same amount of carbs at each meal. Do not \"save up\" your daily allowance of carbs to eat at one meal. 
· Proteins have very little or no carbs per serving. Examples of proteins are beef, chicken, turkey, fish, eggs, tofu, cheese, cottage cheese, and peanut butter. A serving size of meat is 3 ounces, which is about the size of a deck of cards. Examples of meat substitute serving sizes (equal to 1 ounce of meat) are 1/4 cup of cottage cheese, 1 egg, 1 tablespoon of peanut butter, and ½ cup of tofu. How can you eat out and still eat healthy? · Learn to estimate the serving sizes of foods that have carbohydrate. If you measure food at home, it will be easier to estimate the amount in a serving of restaurant food. · If the meal you order has too much carbohydrate (such as potatoes, corn, or baked beans), ask to have a low-carbohydrate food instead. Ask for a salad or green vegetables. · If you use insulin, check your blood sugar before and after eating out to help you plan how much to eat in the future. · If you eat more carbohydrate at a meal than you had planned, take a walk or do other exercise. This will help lower your blood sugar. What else should you know? · Limit saturated fat, such as the fat from meat and dairy products. This is a healthy choice because people who have diabetes are at higher risk of heart disease. So choose lean cuts of meat and nonfat or low-fat dairy products. Use olive or canola oil instead of butter or shortening when cooking. · Don't skip meals. Your blood sugar may drop too low if you skip meals and take insulin or certain medicines for diabetes. · Check with your doctor before you drink alcohol. Alcohol can cause your blood sugar to drop too low. Alcohol can also cause a bad reaction if you take certain diabetes medicines. Follow-up care is a key part of your treatment and safety. Be sure to make and go to all appointments, and call your doctor if you are having problems. It's also a good idea to know your test results and keep a list of the medicines you take. Where can you learn more? Go to http://rickStampsyadrianna.info/. Enter N108 in the search box to learn more about \"Learning About Diabetes Food Guidelines. \" Current as of: March 13, 2017 Content Version: 11.4 © 7391-8599 Inversiones.com. Care instructions adapted under license by [employer] (which disclaims liability or warranty for this information). If you have questions about a medical condition or this instruction, always ask your healthcare professional. [place] [address] any warranty or liability for your use of this information. Introducing [facility]! [facility] introduce portal paciente Thermedicalhart . Ahora se puede acceder a partes de ackerman expediente médico, enviar por correo electrónico la oficina de ackerman médico y solicitar renovaciones de medicamentos en línea. En ackerman navegador de Internet , Bert [place] a [email or web address] Josie brigitte en el usuario por [employer] Hodgkin? Cecile briggs aquí en la sesión [facility]. Verá la página de registro [place]. Ingrese ackerman código de [employer] juliocesar y clarence aparece a continuación. Usted no tendrá que [employer] código después de bianca completado el proceso de registro . Si usted no se inscribe antes de la fecha de caducidad , debe solicitar un nuevo código. · MyChart Código de acceso : [id number] Expires: 2/12/2018  3:55 PM 
 
 Ingresa los últimos cuatro dígitos de ackerman Número de Seguro Social ( xxxx ) y fecha de nacimiento ( dd / mm / aaaa ) clarence se indica y josie clic en Enviar. Usted será llevado a la siguiente página de registro . Crear un ID MyChart . Esta será ackerman ID de inicio de sesión de MyChart y no puede ser [place] , por lo que pensar en rebecca que es [place] y fácil de recordar . Crear rebecca contraseña MyChart . Usted puede cambiar ackerman contraseña en cualquier momento . Ingrese ackerman Password Reset de preguntas y [place] . [place] se puede utilizar en un momento posterior si usted olvida ackerman contraseña. Introduzca ackerman dirección de correo electrónico . [place] recibirá rebecca notificación por correo electrónico cuando la nueva información está disponible en MyChart . Verlinda Rumpf clic en Registrarse. Bryce Homme nazanin y descargar porciones de ackerman expediente médico. 
Josie clic en el enlace de descarga del menú Resumen para descargar rebecca copia portátil de ackerman información médica . Si tiene [employer] , por favor visite la sección de preguntas frecuentes del sitio web MyChart . Recuerde, MyChart NO es que se utilizará para las necesidades urgentes. Para emergencias médicas , llame al 911 . Ahora disponible en ackerman iPhone y Android ! Por favor proporcione bobo resumen de la documentación de cuidado a ackerman próximo proveedor. If you have any questions after today's visit, please call [phone].

## 2017-11-22 ENCOUNTER — OFFICE VISIT (OUTPATIENT)
Dept: FAMILY MEDICINE CLINIC | Age: 42
End: 2017-11-22

## 2017-11-22 VITALS
BODY MASS INDEX: 38.8 KG/M2 | HEIGHT: 68 IN | RESPIRATION RATE: 16 BRPM | HEART RATE: 95 BPM | OXYGEN SATURATION: 97 % | DIASTOLIC BLOOD PRESSURE: 85 MMHG | SYSTOLIC BLOOD PRESSURE: 121 MMHG | WEIGHT: 256 LBS | TEMPERATURE: 98.3 F

## 2017-11-22 DIAGNOSIS — M54.2 NECK PAIN: ICD-10-CM

## 2017-11-22 DIAGNOSIS — M54.12 CERVICAL RADICULITIS: Primary | ICD-10-CM
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
## 2017-11-22 NOTE — PROGRESS NOTES
Chief Complaint   Patient presents with    Arm swelling     PT is being seen due to right arm swelling and tingling X 3 wks; symptoms have gotten worst in the past 5 days.  Pain during the night but tingling during the day

## 2018-02-20 RX ORDER — FENOFIBRATE 160 MG/1
TABLET ORAL
Qty: 90 TAB | Refills: 0 | Status: SHIPPED | OUTPATIENT
Start: 2018-02-20 | End: 2018-05-27 | Stop reason: SDUPTHER

## 2018-05-04 ENCOUNTER — OFFICE VISIT (OUTPATIENT)
Dept: FAMILY MEDICINE CLINIC | Age: 43
End: 2018-05-04

## 2018-05-04 VITALS
DIASTOLIC BLOOD PRESSURE: 86 MMHG | TEMPERATURE: 98.5 F | OXYGEN SATURATION: 97 % | SYSTOLIC BLOOD PRESSURE: 115 MMHG | HEART RATE: 94 BPM | BODY MASS INDEX: 38.04 KG/M2 | HEIGHT: 68 IN | WEIGHT: 251 LBS | RESPIRATION RATE: 16 BRPM

## 2018-05-04 DIAGNOSIS — Z91.09 ENVIRONMENTAL ALLERGIES: ICD-10-CM

## 2018-05-04 DIAGNOSIS — B35.6 TINEA CRURIS: ICD-10-CM

## 2018-05-04 DIAGNOSIS — E11.9 TYPE 2 DIABETES MELLITUS WITHOUT COMPLICATION, WITHOUT LONG-TERM CURRENT USE OF INSULIN (HCC): Primary | ICD-10-CM

## 2018-05-04 NOTE — PATIENT INSTRUCTIONS
-Return to clinic for labs. -Start taking flonase and zyrtec for allergies. You may need artificial tears (eye drops). These are over the counter.     -Drink plenty of water and use sunscreen (at least spf 30) at work.     -I have ordered a sleep study  [address]:  [place] [facility] [place]  [address], [place] [address]   [phone]

## 2018-05-04 NOTE — MR AVS SNAPSHOT
[address] 
[phone] Patient: Terrance Wen MRN: [id number] :[id number] Visit Information Hannah Mustafa y Daisy Personal Médico Departamento Teléfono del Dep. Número de visita 2018  3:20 PM Claire Quevedo, [facility] [phone] Upcoming Health Maintenance Date Due  
 FOOT EXAM Q1 1984 EYE EXAM RETINAL OR DILATED Q1 1984 Pneumococcal 19-64 Medium Risk (1 of 1 - PPSV23) 1993 MICROALBUMIN Q1 2017 LIPID PANEL Q1 2017 HEMOGLOBIN A1C Q6M 2018 Influenza Age 5 to Adult 2018 DTaP/Tdap/Td series (2 - Td) 2027 [address]  Review Complete El: 2018 Por: MD Low Fung del:  2018 Intensidad Anotado Tipo de reacción [employer] Augmentin [Amoxicillin-pot Clavulanate] High 2017   Systemic Anaphylaxis Augmentin [Amoxicillin-pot Clavulanate] High 2017    Anaphylaxis Mucinex [Guaifenesin] High 2015    Anaphylaxis, Swelling Penicillins High 2017   Systemic Anaphylaxis Toradol [Ketorolac] High 2017    Anaphylaxis Penicillins  2017    Shortness of Breath Pork/porcine Containing Products  2015    Rash Vacunas actuales Dub [facility] Distance Influenza Vaccine (Quad) PF 2017  4:04 PM  
 Tdap 2017  4:04 PM  
  
 No revisadas esta visita You Were Diagnosed With   
  
 Lenice Willis Type 2 diabetes mellitus without complication, without long-term current use of insulin (HCC)    -  Primary ICD-10-CM: E11.9 ICD-9-CM: 250.00 HTN (hypertension), benign     ICD-10-CM: I10 
ICD-9-CM: 401.1 Environmental allergies     ICD-10-CM: Z91.09 
ICD-9-CM: V15.09 Partes vitales PS Pulso Temperatura Resp Chamberino ( percentil de crecimiento) Peso (percentil de crecimiento) 115/86 94 98.5 °F (36.9 °C) (Oral) 16 5' 7.91\" (1.725 m) 251 lb (113.9 kg) SpO2 BMI ([facility] Estatus de tabaquísmo 97% 38.27 kg/m2 Never Smoker Historial de signos vitales BMI and BSA Data Body Mass Index Body Surface Area  
 38.27 kg/m 2 2.34 m 2 [facility] Name Phone [id number] Julieta Mello [address] [place] [address] [phone] Patterson lista de medicamentos actualizada Janay Po actualizada 5/4/18  4:53 PM.  Bernice Chiu use patterson lista de medicamentos más reciente. aspirin 81 mg chewable tablet Take 81 mg by mouth daily. * atorvastatin 40 mg tablet También conocido clarence:  LIPITOR Take 40 mg by mouth daily. * atorvastatin 40 mg tablet También conocido clarence:  LIPITOR Take 1 Tab by mouth daily. CENTRUM SPECIALIST HEART 3-200-400 mg-mcg-mg Tab Medicamento genérico:  Mv,Ca,Iron,Min-FA-Phytosterol Take  by mouth. clotrimazole 1 % topical cream  
También conocido clarence:  Mary Cargo Apply  to affected area two (2) times a day. cyclobenzaprine 10 mg tablet También conocido clarence:  FLEXERIL Take 10 mg by mouth three (3) times daily as needed for Muscle Spasm(s). diphenhydrAMINE 25 mg tablet También conocido clarence:  DIPHEDRYL Take 2 Tabs by mouth every six (6) hours as needed for Itching (for itching or allergic reaction). Indications: ALLERGIC REACTIONS * EPINEPHrine 0.3 mg/0.3 mL injection También conocido clarence:  EPIPEN 2-SANJUANITA  
0.3 mL by IntraMUSCular route once as needed for Anaphylaxis or Allergic Response for up to 1 dose. Disp 2 syringes * EPINEPHrine 0.3 mg/0.3 mL injection También conocido clarence:  EPIPEN  
0.3 mL by IntraMUSCular route once as needed for up to 1 dose. fenofibrate 160 mg tablet También conocido clarence:  LOFIBRA TAKE 1 TABLET EVERY DAY Fenofibrate Nanocrystallized 160 mg Tab Si po every day, cancel the capsule order due to the cost  
  
 glimepiride 4 mg tablet También conocido clarence:  AMARYL Take 1 Tab by mouth every morning. lisinopril-hydroCHLOROthiazide 20-12.5 mg per tablet También conocido clarence:  Pamula Colla Take 1 Tab by mouth daily. * metFORMIN 500 mg tablet También conocido clarence:  GLUCOPHAGE Take  by mouth two (2) times daily (with meals). * metFORMIN 1,000 mg tablet También conocido clarence:  GLUCOPHAGE Take 1 Tab by mouth two (2) times daily (with meals). MUCINEX 1,200 mg Ta12 ER tablet Medicamento genérico:  guaiFENesin Take 1,200 mg by mouth two (2) times a day. omega-3 acid ethyl esters 1 gram capsule También conocido clarence:  Elliot Hopes Take 1 g by mouth two (2) times a day. predniSONE 20 mg tablet También conocido clarence:  DELTASONE  
3 tabs (60 mg) daily x 2 days, then 2 tabs (40 mg) daily x 2 days, then 1 tabs (20 mg) daily x 2 days ROBAXIN 500 mg tablet Medicamento genérico:  methocarbamol Take 500 mg by mouth daily as needed. saw palmetto fruit 450 mg Cap Take  by mouth.  
  
 sildenafil citrate 25 mg tablet También conocido clarence:  VIAGRA Take 1 Tab by mouth as needed. traMADol 50 mg tablet También conocido clarence:  ULTRAM  
Take 50 mg by mouth every eight (8) hours as needed for Pain.  
  
 valsartan 40 mg tablet También conocido clarence:  DIOVAN Take 1 Tab by mouth daily. * Aviso:  Esta lista contiene medicamentos que son iguales a otros medicamentos recetados para usted. Marlee las instrucciones con cuidado y pida a ackerman personal médico que revise la lista de medicamentos y las instrucciones correspondientes con usted. Instrucciones para el Paciente   
-Return to clinic for labs. -Start taking flonase and zyrtec for allergies. You may need artificial tears (eye drops). These are over the counter. -Drink plenty of water and use sunscreen (at least spf 30) at work. -I have ordered a sleep study [facility]: 
[facility] / [place] 
[address]  
[phone] Introducing [facility]! [facility] introduce portal paciente MyChart . Ahora se puede acceder a partes de ackerman expediente médico, enviar por correo electrónico la oficina de ackerman médico y solicitar renovaciones de medicamentos en línea. En ackerman navegador de Internet , Kiara Razo a [email or web address]. [facility]/mychart Sigrid clic en el usuario por Juice Serra? Josué Ruiz clic aquí en la sesión Allen Haagensen. Verá la página de registro [place]. Ingrese ackerman código de [employer] juliocesar y clarence aparece a continuación. Usted no tendrá que [employer] código después de bianca completado el proceso de registro . Si usted no se inscribe antes de la fecha de caducidad , debe solicitar un nuevo código. · MyChart Código de acceso : -[id number] Expires: 8/2/2018  2:47 PM 
 
Ingresa los últimos cuatro dígitos de ackerman Número de Seguro Social ( xxxx ) y fecha de nacimiento ( dd / mm / aaaa ) clarence se indica y sigrid clic en Enviar. Usted será llevado a la siguiente página de registro . Crear un ID MyChart . Esta será ackerman ID de inicio de sesión de MyChart y no puede ser [place] , por lo que pensar en rebecca que es Orie Toi y fácil de recordar . Crear rebecca contraseña MyChart . Usted puede cambiar ackerman contraseña en cualquier momento . Ingrese ackerman Password Reset de preguntas y Pearl . Lyons Switch se puede utilizar en un momento posterior si usted olvida ackerman contraseña. Introduzca ackerman dirección de correo electrónico . Guerline Gomes recibirá rebecca notificación por correo electrónico cuando la nueva información está disponible en MyChart . Wily briggs en Registrarse.  Lajyothi Dun nazanin y descargar porciones de ackerman expediente Troy briggs en el enlace de descarga del menú Resumen para descargar Shayne Friday copia portátil de ackerman información médica . Si tiene [employer] , por favor visite la sección de preguntas frecuentes del sitio web MyChart . Recuerde, MyChart NO es que se utilizará para las necesidades urgentes. Para emergencias médicas , llame al 911 . Ahora disponible en ackerman iPhone y Android ! Por favor proporcione bobo resumen de la documentación de cuidado a ackerman próximo proveedor. Your primary care clinician is listed as NONE. If you have any questions after today's visit, please call [phone].

## 2018-05-04 NOTE — PROGRESS NOTES
Chief Complaint:  Chief Complaint   Patient presents with    Hypertension     HPI  Vennie Cranker is a 36 y.o. male who presents for follow up of hypertension. DM:  A1c 7.9 in 11/2017  He is taking metformin 1,000 mg once daily and amaryl 4 mg/d. Takes valsartan and statin. He is really working on trying to increase his exercise and lose weight. Rash:  He has h/o tinea cruris in the groin for which he finds lotrimin helpful. He requests refills because rash gets worse when he sweats at work. 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  No tobacco use    ROS:   No fever  No chest pain  No SOB  No vision changes  No abdominal pain  No leg swelling    Allergies: Allergies   Allergen Reactions    Augmentin [Amoxicillin-Pot Clavulanate] Anaphylaxis    Augmentin [Amoxicillin-Pot Clavulanate] Anaphylaxis    Mucinex [Guaifenesin] Anaphylaxis and Swelling    Penicillins Anaphylaxis    Toradol [Ketorolac] Anaphylaxis    Penicillins Shortness of Breath    Pork/Porcine Containing Products Rash     Meds:   Current Outpatient Prescriptions   Medication Sig Dispense Refill    clotrimazole (LOTRIMIN) 1 % topical cream Apply  to affected area two (2) times a day. 15 g 3    fenofibrate (LOFIBRA) 160 mg tablet TAKE 1 TABLET EVERY DAY 90 Tab 0    saw palmetto fruit 450 mg cap Take  by mouth.  Mv,Ca,Iron,Min-FA-Phytosterol (CENTRUM SPECIALIST HEART) 3-200-400 mg-mcg-mg tab Take  by mouth.  metFORMIN (GLUCOPHAGE) 1,000 mg tablet Take 1 Tab by mouth two (2) times daily (with meals). 180 Tab 1    glimepiride (AMARYL) 4 mg tablet Take 1 Tab by mouth every morning. 90 Tab 1    atorvastatin (LIPITOR) 40 mg tablet Take 1 Tab by mouth daily. 90 Tab 1    valsartan (DIOVAN) 40 mg tablet Take 1 Tab by mouth daily. 90 Tab 3    atorvastatin (LIPITOR) 40 mg tablet Take 40 mg by mouth daily.       sildenafil citrate (VIAGRA) 25 mg tablet Take 1 Tab by mouth as needed. 20 Tab 3    EPINEPHrine (EPIPEN) 0.3 mg/0.3 mL injection 0.3 mL by IntraMUSCular route once as needed for up to 1 dose. 1 Syringe 0    aspirin 81 mg chewable tablet Take 81 mg by mouth daily.  Fenofibrate Nanocrystallized 160 mg tab Si po every day, cancel the capsule order due to the cost 90 Tab 1    EPINEPHrine (EPIPEN 2-SANJUANITA) 0.3 mg/0.3 mL (1:1,000) injection 0.3 mL by IntraMUSCular route once as needed for Anaphylaxis or Allergic Response for up to 1 dose. Disp 2 syringes 0.3 mL 1    diphenhydrAMINE (DIPHEDRYL) 25 mg tablet Take 2 Tabs by mouth every six (6) hours as needed for Itching (for itching or allergic reaction). Indications: ALLERGIC REACTIONS 20 Tab 0    omega-3 acid ethyl esters (LOVAZA) 1 gram capsule Take 1 g by mouth two (2) times a day.  guaiFENesin (MUCINEX) 1,200 mg Ta12 ER tablet Take 1,200 mg by mouth two (2) times a day. PMH:  Past Medical History:   Diagnosis Date    [facility]     DM type [facility]di[facility] mellitus, type 2) [facility]     High blood pressure     High cholesterol     HTN (hypertension), benign     Hypercholesterolemia     Hypertension      Physical Exam:  Visit Vitals    /86    Pulse 94    Temp 98.5 °F (36.9 °C) (Oral)    Resp 16    Ht 5' 7.91\" (1.725 m)    Wt 251 lb (113.9 kg)    SpO2 97%    BMI 38.27 kg/m2     Gen: No apparent distress. Pleasant and conversational.  HEENT: Normocephalic, conjunctiva clear, extraocular eye movements intact, hearing grossly normal bilaterally, nose normal and patent, mucous membranes moist  Neck: Supple, normal ROM  Lungs: Respirations unlabored, clear to auscultation bilaterally  Cardio: Regular rate and rhythm without murmurs, rubs, or gallops   Abdomen: Normoactive bowel sounds, soft, nontender, nondistended  Ext: No peripheral edema or tenderness  Skin: Warm and dry  Neuro: Alert and responds to all questions appropriately. Psych: Makes good eye contact. Appearance, behavior, and conversation appropriate with normal speech rate, fluency, content. Assessment and Plan:     Encounter Diagnoses:    ICD-10-CM ICD-9-CM    1. Type 2 diabetes mellitus without complication, without long-term current use of insulin ([facility]) Q37.3 953.93 METABOLIC PANEL, BASIC      HEMOGLOBIN A1C WITH EAG      LIPID PANEL   2. Environmental allergies Z91.09 V15.09    3. Tinea cruris B35.6 110.3 clotrimazole (LOTRIMIN) 1 % topical cream     -Checking A1c today. Increase metformin to bid. Continue amaryl, valsartan, statin. Encourage lifestyle changes. Recheck A1c in 3 months if elevated. -Discussed otc treatment options for allergies. -Refill clotrimazole. -Rtc 3-6 pending lab results. Will need cholesterol checked at that time as it was not ordered at time of visit.     Jesus Feliz MD  Family Medicine Resident, PGY-3

## 2018-05-04 NOTE — PROGRESS NOTES
Chief Complaint   Patient presents with    Hypertension     1. Have you been to the ER, urgent care clinic since your last visit? Hospitalized since your last visit? No    2. Have you seen or consulted any other health care providers outside of the [address] [place] [address] since your last visit? Include any pap smears or colon screening.  No

## 2018-05-12 ENCOUNTER — TELEPHONE (OUTPATIENT)
Dept: FAMILY MEDICINE CLINIC | Age: 43
End: 2018-05-12

## 2018-05-12 LAB
BUN SERPL-MCNC: 18 MG/DL (ref 6–24)
BUN/CREAT SERPL: 18 (ref 9–20)
CALCIUM SERPL-MCNC: 10.3 MG/DL (ref 8.7–10.2)
CHLORIDE SERPL-SCNC: 99 MMOL/L (ref 96–106)
CO2 SERPL-SCNC: 20 MMOL/L (ref 18–29)
CREAT SERPL-MCNC: 0.99 MG/DL (ref 0.76–1.27)
EST. AVERAGE GLUCOSE BLD GHB EST-MCNC: 174 MG/DL
GFR SERPLBLD CREATININE-BSD FMLA CKD-EPI: 107 ML/MIN/1.73
GFR SERPLBLD CREATININE-BSD FMLA CKD-EPI: 93 ML/MIN/1.73
GLUCOSE SERPL-MCNC: 230 MG/DL (ref 65–99)
HBA1C MFR BLD: 7.7 % (ref 4.8–5.6)
POTASSIUM SERPL-SCNC: 4.7 MMOL/L (ref 3.5–5.2)
SODIUM SERPL-SCNC: 141 MMOL/L (ref 134–144)

## 2018-05-12 RX ORDER — CHLORPHENIRAMINE MALEATE 4 MG
TABLET ORAL 2 TIMES DAILY
Qty: 15 G | Refills: 3 | Status: SHIPPED | OUTPATIENT
Start: 2018-05-12 | End: 2018-12-31

## 2018-05-22 DIAGNOSIS — N18.30 CKD (CHRONIC KIDNEY DISEASE) STAGE 3, GFR 30-59 ML/MIN (HCC): ICD-10-CM

## 2018-05-22 RX ORDER — VALSARTAN 40 MG/1
40 TABLET ORAL DAILY
Qty: 90 TAB | Refills: 3 | Status: SHIPPED | OUTPATIENT
Start: 2018-05-22 | End: 2018-09-21 | Stop reason: SDUPTHER

## 2018-05-22 RX ORDER — ATORVASTATIN CALCIUM 40 MG/1
40 TABLET, FILM COATED ORAL DAILY
Qty: 90 TAB | Refills: 0 | Status: SHIPPED | OUTPATIENT
Start: 2018-05-22 | End: 2018-08-25 | Stop reason: SDUPTHER

## 2018-05-22 NOTE — TELEPHONE ENCOUNTER
Patient needs a refill of the following as a 90-day supply  Requested Prescriptions     Pending Prescriptions Disp Refills    atorvastatin (LIPITOR) 40 mg tablet       Sig: Take 1 Tab by mouth daily.

## 2018-05-27 DIAGNOSIS — E11.9 TYPE 2 DIABETES MELLITUS WITHOUT COMPLICATION, WITHOUT LONG-TERM CURRENT USE OF INSULIN (HCC): ICD-10-CM

## 2018-05-29 RX ORDER — GLIMEPIRIDE 4 MG/1
TABLET ORAL
Qty: 90 TAB | Refills: 1 | Status: SHIPPED | OUTPATIENT
Start: 2018-05-29 | End: 2018-09-21 | Stop reason: SDUPTHER

## 2018-05-29 RX ORDER — FENOFIBRATE 160 MG/1
TABLET ORAL
Qty: 90 TAB | Refills: 0 | Status: SHIPPED | OUTPATIENT
Start: 2018-05-29 | End: 2018-09-21 | Stop reason: SDUPTHER

## 2018-05-31 DIAGNOSIS — E11.9 TYPE 2 DIABETES MELLITUS WITHOUT COMPLICATION, WITHOUT LONG-TERM CURRENT USE OF INSULIN (HCC): ICD-10-CM

## 2018-05-31 NOTE — TELEPHONE ENCOUNTER
Patient needs a refill of the following as a 90-day supply  Requested Prescriptions     Pending Prescriptions Disp Refills    metFORMIN (GLUCOPHAGE) 1,000 mg tablet 180 Tab 1     Sig: Take 1 Tab by mouth two (2) times daily (with meals).

## 2018-06-01 RX ORDER — METFORMIN HYDROCHLORIDE 1000 MG/1
1000 TABLET ORAL 2 TIMES DAILY WITH MEALS
Qty: 180 TAB | Refills: 1 | Status: SHIPPED | OUTPATIENT
Start: 2018-06-01 | End: 2018-12-24 | Stop reason: SDUPTHER

## 2018-06-05 ENCOUNTER — TELEPHONE (OUTPATIENT)
Dept: FAMILY MEDICINE CLINIC | Age: 43
End: 2018-06-05

## 2018-06-05 NOTE — TELEPHONE ENCOUNTER
Per fax from pharmacy, a PA is needed on the clotrimazole 1% cream or an alternative can be sent in. No PA phone number was left on the fax.  See scanned document attached

## 2018-06-11 NOTE — TELEPHONE ENCOUNTER
Pharmacy not aware of alternative. Pt needs to call insurance. Left message on pt's voicemail to return call.

## 2018-06-11 NOTE — TELEPHONE ENCOUNTER
I can send an alternative medication that is on the formulary. Can you check with the pharmacy to see what the formulary options are for treatment of tinea cruris? Thanks!

## 2018-06-22 RX ORDER — EPINEPHRINE 0.3 MG/.3ML
INJECTION SUBCUTANEOUS
Qty: 2 SYRINGE | Refills: 0 | Status: SHIPPED | OUTPATIENT
Start: 2018-06-22 | End: 2019-04-19 | Stop reason: SDUPTHER

## 2018-08-27 RX ORDER — ATORVASTATIN CALCIUM 40 MG/1
TABLET, FILM COATED ORAL
Qty: 90 TAB | Refills: 0 | Status: SHIPPED | OUTPATIENT
Start: 2018-08-27 | End: 2018-09-21 | Stop reason: SDUPTHER

## 2018-08-29 ENCOUNTER — TELEPHONE (OUTPATIENT)
Dept: FAMILY MEDICINE CLINIC | Age: 43
End: 2018-08-29

## 2018-08-29 NOTE — TELEPHONE ENCOUNTER
Attempted to call patient per Dr William Quezada to inform patient of recall on Valsartan and to discontinue the medication. Dr William Quezada will call in Losartan 25 mg for patient instead. Unable to reach patient LVM to call the office.

## 2018-09-04 RX ORDER — FENOFIBRATE 160 MG/1
TABLET ORAL
Qty: 90 TAB | Refills: 0 | OUTPATIENT
Start: 2018-09-04

## 2018-09-21 DIAGNOSIS — N18.30 CKD (CHRONIC KIDNEY DISEASE) STAGE 3, GFR 30-59 ML/MIN (HCC): ICD-10-CM

## 2018-09-21 DIAGNOSIS — I10 ESSENTIAL HYPERTENSION: Primary | ICD-10-CM

## 2018-09-21 DIAGNOSIS — E11.9 TYPE 2 DIABETES MELLITUS WITHOUT COMPLICATION, WITHOUT LONG-TERM CURRENT USE OF INSULIN (HCC): ICD-10-CM

## 2018-09-21 RX ORDER — FENOFIBRATE 160 MG/1
TABLET ORAL
Qty: 90 TAB | Refills: 0 | Status: SHIPPED | OUTPATIENT
Start: 2018-09-21 | End: 2018-12-24 | Stop reason: SDUPTHER

## 2018-09-21 RX ORDER — VALSARTAN 40 MG/1
40 TABLET ORAL DAILY
Qty: 90 TAB | Refills: 0 | Status: SHIPPED | OUTPATIENT
Start: 2018-09-21 | End: 2018-12-24 | Stop reason: SDUPTHER

## 2018-09-21 RX ORDER — GLIMEPIRIDE 4 MG/1
TABLET ORAL
Qty: 90 TAB | Refills: 0 | Status: SHIPPED | OUTPATIENT
Start: 2018-09-21 | End: 2018-12-24 | Stop reason: SDUPTHER

## 2018-09-21 RX ORDER — ATORVASTATIN CALCIUM 40 MG/1
TABLET, FILM COATED ORAL
Qty: 90 TAB | Refills: 0 | Status: SHIPPED | OUTPATIENT
Start: 2018-09-21 | End: 2018-12-24 | Stop reason: SDUPTHER

## 2018-09-21 NOTE — PROGRESS NOTES
Pt requesting refills. Will refill for 3 months. Pt to get labs down between now and next appt which he will scheduled for sometime in next 3 months.

## 2018-10-13 ENCOUNTER — OFFICE VISIT (OUTPATIENT)
Dept: FAMILY MEDICINE CLINIC | Age: 43
End: 2018-10-13

## 2018-10-13 VITALS
RESPIRATION RATE: 20 BRPM | WEIGHT: 254 LBS | HEART RATE: 86 BPM | BODY MASS INDEX: 38.49 KG/M2 | DIASTOLIC BLOOD PRESSURE: 90 MMHG | TEMPERATURE: 98.4 F | OXYGEN SATURATION: 97 % | HEIGHT: 68 IN | SYSTOLIC BLOOD PRESSURE: 136 MMHG

## 2018-10-13 DIAGNOSIS — I10 HYPERTENSION, UNSPECIFIED TYPE: Primary | ICD-10-CM

## 2018-10-13 RX ORDER — CYCLOBENZAPRINE HCL 10 MG
TABLET ORAL
COMMUNITY

## 2018-10-13 NOTE — PROGRESS NOTES
Del Pa is a 36 y.o. male  Chief Complaint   Patient presents with    Blood Pressure Check     taking atorvastatin 40mg, current B/P 136/90 L Arm, denies chest pain or dizziness, states has poor eating habits     Visit Vitals    /90 (BP 1 Location: Left arm, BP Patient Position: Sitting)    Pulse 86    Temp 98.4 °F (36.9 °C) (Oral)    Resp 20    Ht 5' 7.91\" (1.725 m)    Wt 254 lb (115.2 kg)    SpO2 97%    BMI 38.72 kg/m2       1. Have you been to the ER, urgent care clinic since your last visit? Hospitalized since your last visit? No    2. Have you seen or consulted any other health care providers outside of the [address] [place] [address] since your last visit? Include any pap smears or colon screening.  No  Health Maintenance Due   Topic Date Due    FOOT EXAM Q1  12/21/1984    EYE EXAM RETINAL OR DILATED Q1  12/21/1984    Pneumococcal 19-64 Medium Risk (1 of 1 - PPSV23) 12/21/1993    MICROALBUMIN Q1  01/08/2017    LIPID PANEL Q1  01/08/2017    Influenza Age 5 to Adult  08/01/2018    HEMOGLOBIN A1C Q6M  11/11/2018

## 2018-10-15 NOTE — PROGRESS NOTES
HISTORY OF PRESENT ILLNESS  Lavaughn Councilman is a 36 y.o. male. HPI   This gentleman presents to the clinic c/o \"i want my blood pressure checked\"  He states he went to a store recently and checked his blood pressure and it was 130/90 and he \"got very worried because it was high\"  No other complaints. No headache, chest pain or sob    Review of Systems   Constitutional: Negative for chills and fever. Respiratory: Negative for shortness of breath. Cardiovascular: Negative for chest pain and palpitations. Physical Exam   Constitutional: He appears well-developed and well-nourished. No distress. Cardiovascular: Normal rate, regular rhythm and normal heart sounds. No murmur heard. Pulmonary/Chest: Effort normal and breath sounds normal. No respiratory distress. He has no wheezes. Skin: He is not diaphoretic. ASSESSMENT and PLAN    ICD-10-CM ICD-9-CM    1.  Hypertension, unspecified type I10 401.9    Reassurance for now  Continue current BP meds and follow up with PCP  Precautions given

## 2018-12-24 ENCOUNTER — OFFICE VISIT (OUTPATIENT)
Dept: FAMILY MEDICINE CLINIC | Age: 43
End: 2018-12-24

## 2018-12-24 VITALS
RESPIRATION RATE: 16 BRPM | WEIGHT: 257 LBS | DIASTOLIC BLOOD PRESSURE: 82 MMHG | SYSTOLIC BLOOD PRESSURE: 126 MMHG | HEIGHT: 68 IN | OXYGEN SATURATION: 96 % | HEART RATE: 96 BPM | BODY MASS INDEX: 38.95 KG/M2 | TEMPERATURE: 98.8 F

## 2018-12-24 DIAGNOSIS — Z23 ENCOUNTER FOR IMMUNIZATION: ICD-10-CM

## 2018-12-24 DIAGNOSIS — E11.9 TYPE 2 DIABETES MELLITUS WITHOUT COMPLICATION, WITHOUT LONG-TERM CURRENT USE OF INSULIN (HCC): Primary | ICD-10-CM

## 2018-12-24 DIAGNOSIS — E78.00 HIGH CHOLESTEROL: ICD-10-CM

## 2018-12-24 DIAGNOSIS — I10 HTN (HYPERTENSION), BENIGN: ICD-10-CM

## 2018-12-24 LAB
ALBUMIN UR QL STRIP: 150 MG/L
CREATININE, URINE POC: 100 MG/DL
MICROALBUMIN/CREAT RATIO POC: NORMAL MG/G

## 2018-12-24 RX ORDER — ATORVASTATIN CALCIUM 40 MG/1
TABLET, FILM COATED ORAL
Qty: 90 TAB | Refills: 1 | Status: SHIPPED | OUTPATIENT
Start: 2018-12-24 | End: 2019-08-02 | Stop reason: SDUPTHER

## 2018-12-24 RX ORDER — VALSARTAN 40 MG/1
40 TABLET ORAL DAILY
Qty: 90 TAB | Refills: 2 | Status: SHIPPED | OUTPATIENT
Start: 2018-12-24 | End: 2019-08-02 | Stop reason: SDUPTHER

## 2018-12-24 RX ORDER — GLIMEPIRIDE 4 MG/1
TABLET ORAL
Qty: 90 TAB | Refills: 1 | Status: SHIPPED | OUTPATIENT
Start: 2018-12-24 | End: 2019-04-24

## 2018-12-24 RX ORDER — METFORMIN HYDROCHLORIDE 1000 MG/1
1000 TABLET ORAL 2 TIMES DAILY WITH MEALS
Qty: 180 TAB | Refills: 1 | Status: SHIPPED | OUTPATIENT
Start: 2018-12-24 | End: 2019-04-19 | Stop reason: SDUPTHER

## 2018-12-24 RX ORDER — FENOFIBRATE 160 MG/1
TABLET ORAL
Qty: 90 TAB | Refills: 1 | Status: SHIPPED | OUTPATIENT
Start: 2018-12-24 | End: 2019-08-02 | Stop reason: SDUPTHER

## 2018-12-24 NOTE — PATIENT INSTRUCTIONS
[facility] (multiple locations across [place])  [phone]    Maria Elena Guzmán MD  [facility]  [address] [facility]   Phone: [phone]  Fax: [phone]    [facility] Ophthalmology  [facility] location  [facility] Department of Ophthalmology  [address] [facility], [address] [place] [address], [address]  Phone: [phone]   Fax: [phone]    [facility] location  [facility] Department of Ophthalmology   [address] [facility]  Phone: [phone]   Fax: [phone]    Ophthalmology Clinic location  [facility] Department of Ophthalmology  [address]  [place], [address]  Phone: [phone]  Fax: [phone]

## 2018-12-24 NOTE — PROGRESS NOTES
Subjective:      Chelsie Velasco is a 39 y.o. male who presents for follow up of hypertension, diabetes and hyperlipidemia. He is a new patient to me. He is currently on Metformin 1000 mg daily and Glimepiride 4 mg daily. Per chart review he is supposed to be taking Metformin twice a day. Tolerating medication well, has not noticed any side effects. Last A1c - 7.7 (May 2018)  Is on Lipitor 40 mg daily and Fenofibrate daily  Is on Valsartan 40 mg daily,tolerating medication well. Does not measure BP at home. Diet - has not been doing well for the past two months. Is determined to make changes     Exercise - does walk regularly and exercise regularly. He needs refill of all his medications. [address]     Works in construction   Does not smoke or drink     HM   Flu shot - October 2018   Due for Pneumonia vaccine    Due for diabetic eye exam       Review of Systems   Constitutional: Negative for chills and fever. Respiratory: Negative for cough and shortness of breath. Cardiovascular: Negative for chest pain and palpitations. Gastrointestinal: Negative for abdominal pain, nausea and vomiting. Neurological: Negative for dizziness and headaches. PMHx:  Past Medical History:   Diagnosis Date    Diabetes ([facility] [address]     DM type 2 (diabetes mellitus, type 2) ([facility] [address]     High blood pressure     High cholesterol     HTN (hypertension), benign     Hypercholesterolemia     Hypertension        Meds:   Current Outpatient Medications   Medication Sig Dispense Refill    metFORMIN (GLUCOPHAGE) 1,000 mg tablet Take 1 Tab by mouth two (2) times daily (with meals). 180 Tab 1    glimepiride (AMARYL) 4 mg tablet TAKE 1 TAB BY MOUTH EVERY MORNING. 90 Tab 1    valsartan (DIOVAN) 40 mg tablet Take 1 Tab by mouth daily.  90 Tab 2    atorvastatin (LIPITOR) 40 mg tablet TAKE 1 TABLET BY MOUTH EVERY DAY 90 Tab 1    fenofibrate (LOFIBRA) 160 mg tablet TAKE 1 TABLET EVERY DAY 90 Tab 1    EPINEPHrine (EPIPEN) 0.3 mg/0.3 mL injection 0.3 ML BY IM ROUTE ONCE AS NEEDED FOR UPTO 1 DOSE 2 Syringe 0    clotrimazole (LOTRIMIN) 1 % topical cream Apply  to affected area two (2) times a day. 15 g 3    saw palmetto fruit 450 mg cap Take  by mouth.  Mv,Ca,Iron,Min-FA-Phytosterol (CENTRUM SPECIALIST HEART) 3-200-400 mg-mcg-mg tab Take  by mouth.  sildenafil citrate (VIAGRA) 25 mg tablet Take 1 Tab by mouth as needed. 20 Tab 3    aspirin 81 mg chewable tablet Take 81 mg by mouth daily.  diphenhydrAMINE (DIPHEDRYL) 25 mg tablet Take 2 Tabs by mouth every six (6) hours as needed for Itching (for itching or allergic reaction). Indications: ALLERGIC REACTIONS 20 Tab 0    omega-3 acid ethyl esters (LOVAZA) 1 gram capsule Take 1 g by mouth two (2) times a day.  cyclobenzaprine (FLEXERIL) 10 mg tablet Take  by mouth three (3) times daily as needed for Muscle Spasm(s).  guaiFENesin (MUCINEX) 1,200 mg Ta12 ER tablet Take 1,200 mg by mouth two (2) times a day. Allergies:    Allergies   Allergen Reactions    Augmentin [Amoxicillin-Pot Clavulanate] Anaphylaxis    Augmentin [Amoxicillin-Pot Clavulanate] Anaphylaxis    Mucinex [Guaifenesin] Anaphylaxis and Swelling    Penicillins Anaphylaxis    Toradol [Ketorolac] Anaphylaxis    Penicillins Shortness of Breath    Pork/Porcine Containing Products Rash       Smoker:  Social History     Tobacco Use   Smoking Status Never Smoker   Smokeless Tobacco Never Used       ETOH:   Social History     Substance and Sexual Activity   Alcohol Use No       FH:   Family History   Problem Relation Age of Onset    No Known Problems Mother     No Known Problems Father     Hypertension Neg Hx     Diabetes Neg Hx          Objective:     Visit Vitals  /82   Pulse 96   Temp 98.8 °F (37.1 °C) (Oral)   Resp 16   Ht 5' 7.91\" (1.725 m)   Wt 257 lb (116.6 kg)   SpO2 96%   BMI 39.18 kg/m²       Physical Exam   Constitutional: He is oriented to person, place, and time and well-developed, well-nourished, and in no distress. No distress. HENT:   Head: Normocephalic and atraumatic. Cardiovascular: Regular rhythm and normal heart sounds. Exam reveals no friction rub. No murmur heard. Pulmonary/Chest: Effort normal and breath sounds normal. No respiratory distress. He has no wheezes. Neurological: He is alert and oriented to person, place, and time. Skin: Skin is warm and dry. He is not diaphoretic. Assessment:     38 yo male who comes in for follow up of:     ICD-10-CM ICD-9-CM    1. Type 2 diabetes mellitus without complication, without long-term current use of insulin (HCC) E11.9 250.00 LIPID PANEL      HEMOGLOBIN A1C WITH EAG      METABOLIC PANEL, COMPREHENSIVE      AMB POC URINE, MICROALBUMIN, SEMIQUANT (3 RESULTS)      metFORMIN (GLUCOPHAGE) 1,000 mg tablet      glimepiride (AMARYL) 4 mg tablet      atorvastatin (LIPITOR) 40 mg tablet      REFERRAL TO OPHTHALMOLOGY   2. HTN (hypertension), benign I10 401.1 valsartan (DIOVAN) 40 mg tablet   3. High cholesterol E78.00 272.0    4.  Encounter for immunization Z23 V03.89 PNEUMOCOCCAL POLYSACCHARIDE VACCINE, 23-VALENT, ADULT OR IMMUNOSUPPRESSED PT DOSE,         Plan:     - Continue current medications, will make adjustment based on labs  - Follow up labs as above   - F/U in 3 months for repeat lab-work including a urine check   - Referral to Opthalmology for annual eye exam   - Patient explained the importance of medical compliance  - Patient explained the importance of exercising 30 minutes a day 5 days a week, the importance of a strict diabetic diet to reduce future cardiovascular risk   - Medications and possible side effects of the medications discussed with the patient   - Consider nutrition consult for diabetic education   - Received Pneumonia vaccine today   - Patient understands the management plan and agrees to comply with the plan      Patient is counseled to return to the office if symptoms do not improve as expected. Urgent consultation with the nearest Emergency Department is strongly recommended if condition worsens. Patient is counseled to follow up as recommended and to inform the office if any changes in treatment are recommended.           Signed By:  Mikey Vieira MD    Family Medicine Resident

## 2018-12-24 NOTE — PROGRESS NOTES
39year old male here for followup of HTN, diabetes, hyperlipidemia    Got labs done today    I reviewed with the resident the medical history and the resident's findings on the physical examination. I discussed with the resident the patient's diagnosis and concur with the plan.

## 2018-12-25 LAB
ALBUMIN SERPL-MCNC: 4.5 G/DL (ref 3.5–5.5)
ALBUMIN/GLOB SERPL: 1.8 {RATIO} (ref 1.2–2.2)
ALP SERPL-CCNC: 103 IU/L (ref 39–117)
ALT SERPL-CCNC: 33 IU/L (ref 0–44)
AST SERPL-CCNC: 19 IU/L (ref 0–40)
BILIRUB SERPL-MCNC: 0.4 MG/DL (ref 0–1.2)
BUN SERPL-MCNC: 20 MG/DL (ref 6–24)
BUN/CREAT SERPL: 20 (ref 9–20)
CALCIUM SERPL-MCNC: 10.2 MG/DL (ref 8.7–10.2)
CHLORIDE SERPL-SCNC: 102 MMOL/L (ref 96–106)
CHOLEST SERPL-MCNC: 172 MG/DL (ref 100–199)
CO2 SERPL-SCNC: 22 MMOL/L (ref 20–29)
CREAT SERPL-MCNC: 0.99 MG/DL (ref 0.76–1.27)
EST. AVERAGE GLUCOSE BLD GHB EST-MCNC: 252 MG/DL
GLOBULIN SER CALC-MCNC: 2.5 G/DL (ref 1.5–4.5)
GLUCOSE SERPL-MCNC: 263 MG/DL (ref 65–99)
HBA1C MFR BLD: 10.4 % (ref 4.8–5.6)
HDLC SERPL-MCNC: 42 MG/DL
INTERPRETATION, 910389: NORMAL
LDLC SERPL CALC-MCNC: 64 MG/DL (ref 0–99)
Lab: NORMAL
POTASSIUM SERPL-SCNC: 4.6 MMOL/L (ref 3.5–5.2)
PROT SERPL-MCNC: 7 G/DL (ref 6–8.5)
SODIUM SERPL-SCNC: 137 MMOL/L (ref 134–144)
TRIGL SERPL-MCNC: 330 MG/DL (ref 0–149)
VLDLC SERPL CALC-MCNC: 66 MG/DL (ref 5–40)

## 2018-12-26 ENCOUNTER — TELEPHONE (OUTPATIENT)
Dept: FAMILY MEDICINE CLINIC | Age: 43
End: 2018-12-26

## 2018-12-30 ENCOUNTER — TELEPHONE (OUTPATIENT)
Dept: FAMILY MEDICINE CLINIC | Age: 43
End: 2018-12-30

## 2018-12-30 NOTE — TELEPHONE ENCOUNTER
Attempted to call patient to discuss labs from last visit. Did no . Left VM. Called Ms Jamal Armenta (emergency contact) and instructed her to tell him to make appointment this week to discuss labs. All questions answered.     Saulo Ferro MD  1:03 PM

## 2018-12-31 ENCOUNTER — OFFICE VISIT (OUTPATIENT)
Dept: FAMILY MEDICINE CLINIC | Age: 43
End: 2018-12-31

## 2018-12-31 VITALS
HEART RATE: 97 BPM | TEMPERATURE: 98.5 F | HEIGHT: 68 IN | BODY MASS INDEX: 39.4 KG/M2 | DIASTOLIC BLOOD PRESSURE: 83 MMHG | SYSTOLIC BLOOD PRESSURE: 124 MMHG | RESPIRATION RATE: 18 BRPM | WEIGHT: 260 LBS | OXYGEN SATURATION: 96 %

## 2018-12-31 DIAGNOSIS — E11.620 TYPE 2 DIABETES MELLITUS WITH DIABETIC DERMATITIS, WITHOUT LONG-TERM CURRENT USE OF INSULIN (HCC): Primary | ICD-10-CM

## 2018-12-31 RX ORDER — INSULIN PUMP SYRINGE, 3 ML
EACH MISCELLANEOUS
Qty: 1 KIT | Refills: 0 | Status: SHIPPED | OUTPATIENT
Start: 2018-12-31

## 2018-12-31 RX ORDER — LANCETS
EACH MISCELLANEOUS
Qty: 1 EACH | Refills: 11 | Status: SHIPPED | OUTPATIENT
Start: 2018-12-31

## 2018-12-31 RX ORDER — PRENATAL VIT 91/IRON/FOLIC/DHA 28-975-200
COMBINATION PACKAGE (EA) ORAL 2 TIMES DAILY
Qty: 30 G | Refills: 0 | Status: SHIPPED | OUTPATIENT
Start: 2018-12-31 | End: 2019-08-02 | Stop reason: SDUPTHER

## 2018-12-31 NOTE — PROGRESS NOTES
[address]    Subjective:   Zayra Bnudy is a 39 y.o. male with history of HTN, HLD, DM2  CC: Follow-up on DM2  History provided by patient     HPI:    Patient was in clinic a week ago for routine labwork and follow-up on chronic conditions. However, he was advised to come back to clinic today for follow-up on DM2 as A1c has worsened from 7.7 to 10.4. He states that he has been drinking a lot of Monster energy drinks for the last few months, as someone mistakenly told him it was okay to drink while being diabetic. Patient used to be on metformin 1000mg daily, but reports switching to BID frequency a week ago. He denies polyuria and polydipsia at this visit. Patient also complained of rash near low pelvic area, due to diabetes per his report. Rash is not painful or itchy. He has been applying Lomitrin cream for several months now, without significant change. So he was wondering if he could try different Rx. Otherwise, he is doing well and has no other acute complaint at this visit. Current Outpatient Medications on File Prior to Visit   Medication Sig Dispense Refill    metFORMIN (GLUCOPHAGE) 1,000 mg tablet Take 1 Tab by mouth two (2) times daily (with meals). 180 Tab 1    glimepiride (AMARYL) 4 mg tablet TAKE 1 TAB BY MOUTH EVERY MORNING. 90 Tab 1    valsartan (DIOVAN) 40 mg tablet Take 1 Tab by mouth daily. 90 Tab 2    atorvastatin (LIPITOR) 40 mg tablet TAKE 1 TABLET BY MOUTH EVERY DAY 90 Tab 1    fenofibrate (LOFIBRA) 160 mg tablet TAKE 1 TABLET EVERY DAY 90 Tab 1    EPINEPHrine (EPIPEN) 0.3 mg/0.3 mL injection 0.3 ML BY IM ROUTE ONCE AS NEEDED FOR UPTO 1 DOSE 2 Syringe 0    saw palmetto fruit 450 mg cap Take  by mouth.  Mv,Ca,Iron,Min-FA-Phytosterol (CENTRUM SPECIALIST HEART) 3-200-400 mg-mcg-mg tab Take  by mouth.  sildenafil citrate (VIAGRA) 25 mg tablet Take 1 Tab by mouth as needed. 20 Tab 3    aspirin 81 mg chewable tablet Take 81 mg by mouth daily.       omega-3 acid ethyl esters (LOVAZA) 1 gram capsule Take 1 g by mouth two (2) times a day.  cyclobenzaprine (FLEXERIL) 10 mg tablet Take  by mouth three (3) times daily as needed for Muscle Spasm(s). No current facility-administered medications on file prior to visit. Patient Active Problem List   Diagnosis Code    Anaphylactic reaction T78. 2XXA    Type 2 diabetes mellitus without complication (HCC) D58.3    Dental caries K02.9    HTN (hypertension), benign I10    Hyperlipidemia E78.5       Social History     Socioeconomic History    Marital status:      Spouse name: Not on file    Number of children: Not on file    Years of education: Not on file    Highest education level: Not on file   Social Needs    Financial resource strain: Not on file    Food insecurity - worry: Not on file    Food insecurity - inability: Not on file   [employer] needs - medical: Not on file   [employer] needs - non-medical: Not on file   Occupational History    Not on file   Tobacco Use    Smoking status: Never Smoker    Smokeless tobacco: Never Used   Substance and Sexual Activity    Alcohol use: No    Drug use: No    Sexual activity: Yes     Partners: Female     Birth control/protection: None   Other Topics Concern    Not on file   Social History Narrative    ** Merged History Encounter **         ** Merged History Encounter **            Review of Systems   Constitutional: Negative for chills and fever. HENT: Negative for congestion. Eyes: Negative for blurred vision. Respiratory: Negative for cough. Cardiovascular: Negative for chest pain and palpitations. Gastrointestinal: Negative for abdominal pain and vomiting. Genitourinary: Negative for dysuria. Musculoskeletal: Negative for myalgias. Skin: Positive for rash. Negative for itching. Neurological: Negative for dizziness and headaches.          Objective:     Visit Vitals  /83 (BP 1 Location: Right arm, BP Patient Position: Sitting)   Pulse 97   Temp 98.5 °F (36.9 °C) (Oral)   Resp 18   Ht 5' 7.9\" (1.725 m)   Wt 260 lb (117.9 kg)   SpO2 96%   BMI 39.65 kg/m²        Physical Exam   Constitutional: He is oriented to person, place, and time. No distress. Obese, pleasant, in NAD   HENT:   Head: Normocephalic and atraumatic. Eyes: Conjunctivae and EOM are normal. Right eye exhibits no discharge. Left eye exhibits no discharge. Cardiovascular: Normal rate, regular rhythm and normal heart sounds. Pulmonary/Chest: Effort normal and breath sounds normal. No respiratory distress. He has no wheezes. Abdominal: Soft. Bowel sounds are normal. He exhibits no distension. There is no tenderness. Musculoskeletal: He exhibits no edema or tenderness. Neurological: He is alert and oriented to person, place, and time. Skin: Skin is warm. Rash (small erythematous patches notes on mid-groin area, without discharge or tenderness on palpation. ) noted. He is not diaphoretic. Psychiatric: He has a normal mood and affect. His behavior is normal. Thought content normal.       Pertinent Labs/Studies: N/A      Assessment and orders:     Pt is 43yro who presents to clinic for follow-up on DM2 and rash. ICD-10-CM ICD-9-CM    1. Type 2 diabetes mellitus with diabetic dermatitis, without long-term current use of insulin (HCC) E11.620 250.80 Blood-Glucose Meter monitoring kit      lancets misc      terbinafine HCl (LAMISIL) 1 % topical cream     Diagnoses and all orders for this visit:    1. Type 2 diabetes mellitus with diabetic dermatitis, without long-term current use of insulin (HCC)  Given recent change in medication, will keep on current regimen. Advised to discontinue all sort of energy drinks or sugary drinks. Also advised on diet with less carbs and fat. Will switch Rx to terbinafine for diabetic dermatopathy at this visit, and will f/u at next visit. -     Blood-Glucose Meter monitoring kit;  Check blood glucose  - lancets misc; Use with blood glucose monitor  -     terbinafine HCl (LAMISIL) 1 % topical cream; Apply  to affected area two (2) times a day. Follow-up Disposition:  Return in about 3 months (around 3/31/2019) for follow-up. I have reviewed patient medical and social history and medications. I have reviewed pertinent labs results and other data. I have discussed the diagnosis with the patient and the intended plan as seen in the above orders. The patient has received an after-visit summary and questions were answered concerning future plans. I have discussed medication side effects and warnings with the patient as well.     Mor Yung MD  Resident [facility]  12/31/18

## 2018-12-31 NOTE — PROGRESS NOTES
Identified Patient with two Patient identifiers (Name and ). Two Patient Identifiers confirmed. Reviewed record in preparation for visit and have obtained necessary documentation. Chief Complaint   Patient presents with    Diabetes     follow up - discuss labs - change medicine       Visit Vitals  /83 (BP 1 Location: Right arm, BP Patient Position: Sitting)   Pulse 97   Temp 98.5 °F (36.9 °C) (Oral)   Resp 18   Ht 5' 7.9\" (1.725 m)   Wt 260 lb (117.9 kg)   SpO2 96%   BMI 39.65 kg/m²       1. Have you been to the ER, urgent care clinic since your last visit? Hospitalized since your last visit? No    2. Have you seen or consulted any other health care providers outside of the [address] [place] [address] since your last visit? Include any pap smears or colon screening.  No

## 2019-01-08 ENCOUNTER — TELEPHONE (OUTPATIENT)
Dept: FAMILY MEDICINE CLINIC | Age: 44
End: 2019-01-08

## 2019-04-19 ENCOUNTER — OFFICE VISIT (OUTPATIENT)
Dept: FAMILY MEDICINE CLINIC | Age: 44
End: 2019-04-19

## 2019-04-19 VITALS
RESPIRATION RATE: 16 BRPM | TEMPERATURE: 97 F | OXYGEN SATURATION: 98 % | DIASTOLIC BLOOD PRESSURE: 78 MMHG | HEIGHT: 68 IN | WEIGHT: 254 LBS | HEART RATE: 88 BPM | BODY MASS INDEX: 38.49 KG/M2 | SYSTOLIC BLOOD PRESSURE: 130 MMHG

## 2019-04-19 DIAGNOSIS — E66.9 CLASS 2 OBESITY WITH BODY MASS INDEX (BMI) OF 38.0 TO 38.9 IN ADULT, UNSPECIFIED OBESITY TYPE, UNSPECIFIED WHETHER SERIOUS COMORBIDITY PRESENT: ICD-10-CM

## 2019-04-19 DIAGNOSIS — E11.9 TYPE 2 DIABETES MELLITUS WITHOUT COMPLICATION, WITHOUT LONG-TERM CURRENT USE OF INSULIN (HCC): Primary | ICD-10-CM

## 2019-04-19 DIAGNOSIS — I10 ESSENTIAL HYPERTENSION: ICD-10-CM

## 2019-04-19 RX ORDER — EPINEPHRINE 0.3 MG/.3ML
INJECTION SUBCUTANEOUS
Qty: 2 SYRINGE | Refills: 1 | Status: SHIPPED | OUTPATIENT
Start: 2019-04-19 | End: 2020-05-20

## 2019-04-19 RX ORDER — METFORMIN HYDROCHLORIDE 1000 MG/1
1000 TABLET ORAL 2 TIMES DAILY WITH MEALS
Qty: 180 TAB | Refills: 1 | Status: SHIPPED | OUTPATIENT
Start: 2019-06-20 | End: 2019-08-02 | Stop reason: SDUPTHER

## 2019-04-19 NOTE — PROGRESS NOTES
Subjective:      Madison Jeffrey is a 39 y.o. male who presents for follow up of DM and hypertension. He is currently on Metformin 1000 mg bid and Amaryl 4mg daily. Tolerating medication well has not noticed any side effects to  the medications. Is on Valsartan 40 mg daily for hypertension, tolerating medication, has not noticed any side effects. Is trying to eat healthy since last visit. Eating more fruits and vegetables. Is exercising more frequently. Home BP: usually runs 120's/80's. Does not bring in log today   Also needs refill of her Epipen today . DIABETIC CARE CHECKLIST   1. Previous lab work reviewed 12/2018   - Last A1c: 10.4   - Last lipid panel: total cholesterol 172, LDL 64   - Last creatinine level: 0.99  2. Patient on ASA - Yes  3. Patient on Statin- Yes  4. Patient on ACE- On ARB  5. Patient attempting to follow diabetic diet - Yes   6. Activity log - No  7. Last eye check - Due for checkup    8. Last comprehensive foot exam - Check today   9. Last flu shot: 2018  10. Last Pneumococcal vaccination: 2018      ROS:  General/Constitutional:   No headache, fever  Neck:   No swelling, masses,   Cardiac:    No chest pain , palpitations   Respiratory:   No cough or shortness of breath     GI:   No nausea/vomiting, diarrhea    :   No dysuria or  hematuria    Neurological:   No loss of consciousness, dizziness  PMHx:  Past Medical History:   Diagnosis Date    Diabetes ([facility] [address]     DM type 2 (diabetes mellitus, type 2) (HCC)     High blood pressure     High cholesterol     HTN (hypertension), benign     Hypercholesterolemia     Hypertension        Meds:   Current Outpatient Medications   Medication Sig Dispense Refill    EPINEPHrine (EPIPEN) 0.3 mg/0.3 mL injection 0.3 ML BY IM ROUTE ONCE AS NEEDED FOR UPTO 1 DOSE 2 Syringe 1    [START ON 6/20/2019] metFORMIN (GLUCOPHAGE) 1,000 mg tablet Take 1 Tab by mouth two (2) times daily (with meals).  180 Tab 1    glucose blood VI test strips (ASCENSIA AUTODISC VI, ONE TOUCH ULTRA TEST VI) strip Use for blood glucose monitoring 50 Strip 5    Blood-Glucose Meter monitoring kit Check blood glucose 1 Kit 0    lancets misc Use with blood glucose monitor 1 Each 11    terbinafine HCl (LAMISIL) 1 % topical cream Apply  to affected area two (2) times a day. 30 g 0    glimepiride (AMARYL) 4 mg tablet TAKE 1 TAB BY MOUTH EVERY MORNING. 90 Tab 1    valsartan (DIOVAN) 40 mg tablet Take 1 Tab by mouth daily. 90 Tab 2    atorvastatin (LIPITOR) 40 mg tablet TAKE 1 TABLET BY MOUTH EVERY DAY 90 Tab 1    fenofibrate (LOFIBRA) 160 mg tablet TAKE 1 TABLET EVERY DAY 90 Tab 1    cyclobenzaprine (FLEXERIL) 10 mg tablet Take  by mouth three (3) times daily as needed for Muscle Spasm(s).  Mv,Ca,Iron,Min-FA-Phytosterol (CENTRUM SPECIALIST HEART) 3-200-400 mg-mcg-mg tab Take  by mouth.  aspirin 81 mg chewable tablet Take 81 mg by mouth daily.  saw palmetto fruit 450 mg cap Take  by mouth.  sildenafil citrate (VIAGRA) 25 mg tablet Take 1 Tab by mouth as needed. 20 Tab 3    omega-3 acid ethyl esters (LOVAZA) 1 gram capsule Take 1 g by mouth two (2) times a day. Allergies:    Allergies   Allergen Reactions    Augmentin [Amoxicillin-Pot Clavulanate] Anaphylaxis    Augmentin [Amoxicillin-Pot Clavulanate] Anaphylaxis    Mucinex [Guaifenesin] Anaphylaxis and Swelling    Penicillins Anaphylaxis    Toradol [Ketorolac] Anaphylaxis    Penicillins Shortness of Breath    Pork/Porcine Containing Products Rash       Smoker:  Social History     Tobacco Use   Smoking Status Never Smoker   Smokeless Tobacco Never Used       ETOH:   Social History     Substance and Sexual Activity   Alcohol Use No       FH:   Family History   Problem Relation Age of Onset    No Known Problems Mother     No Known Problems Father     Hypertension Neg Hx     Diabetes Neg Hx          Objective:     Visit Vitals  /78   Pulse 88   Temp 97 °F (36.1 °C) (Oral) Resp 16   Ht 5' 7.9\" (1.725 m)   Wt 254 lb (115.2 kg)   SpO2 98%   BMI 38.73 kg/m²       Physical Examination:   GEN: No apparent distress. Alert and oriented and responds to all questions appropriately. LUNGS: Respirations unlabored; clear to auscultation bilaterally  CARDIOVASCULAR: Regular, rate, and rhythm without murmurs, gallops or rubs   EXT: Well perfused. No edema. FOOT EXAM: Diabetic foot exam:                                   Right   Proprioception normal    Sharp/dull discrimination normal    Filament test normal sensation with micro filament    Pulse DP: 2+ (normal)    Pulse PT: 2+ (normal)    Deformities: None                 Left   Proprioception normal    Sharp/dull discrimination normal    Filament test normal sensation with micro filament    Pulse DP: 2+ (normal)    Pulse PT: 2+ (normal)    Deformities: None        Assessment:       ICD-10-CM ICD-9-CM    1.  Type 2 diabetes mellitus without complication, without long-term current use of insulin ([facility]) U51.3 315.29 METABOLIC PANEL, BASIC      HEMOGLOBIN A1C WITH EAG       DIABETES FOOT EXAM      metFORMIN (GLUCOPHAGE) 1,000 mg tablet      REFERRAL TO DIABETIC EDUCATION      REFERRAL TO OPHTHALMOLOGY   2. Essential hypertension Q71 302.6 METABOLIC PANEL, BASIC      HEMOGLOBIN A1C WITH EAG   3. Class 2 obesity with body mass index (BMI) of 38.0 to 38.9 in adult, unspecified obesity type, unspecified whether serious comorbidity present E66.9 278.00     Z68.38 V85.38        Plan:     - Follow up A1c and BMP today   - Continue current medications for now, will make adjustment based on labs   - F/U in 3 months for repeat lab-work including a urine check   - Yearly ocular exams and annual foot exams   - Patient explained the importance of medical compliance  - Patient explained the importance of exercising 30 minutes a day 5 days a week, the importance of a strict diabetic diet to reduce future cardiovascular risk   - Medications and possible side effects of the medications discussed with the patient   - Referral to Diabetic treatment center   - Counseled on diet and exercise   - Patient understands the management plan and agrees to comply with the plan      Patient is counseled to return to the office if symptoms do not improve as expected. Urgent consultation with the nearest Emergency Department is strongly recommended if condition worsens. Patient is counseled to follow up as recommended and to inform the office if any changes in treatment are recommended.           Signed By:  Ben Jalloh MD    Family Medicine Resident

## 2019-04-19 NOTE — PATIENT INSTRUCTIONS
Aprenda sobre el cuidado de los pies y de las uñas de los pies - [ [facility] ]  Cuidado de los pies y las uñas de los pies: Generalidades  Revisarle los pies a ackerman ser Lorraine Certain y mantenérselos limpios y suaves puede ayudar a evitar que la piel se le agriete e infecte. Cutten es especialmente importante para las personas que tienen diabetes. Mantenerle las uñas de los pies recortadas, y con esmalte si eso es lo que le gusta a la persona, también ayuda a que la persona se sienta charisse aseada. Si la persona a la que usted cuida tiene diabetes o tiene problemas en los pies clarence juanetes y callosidades importantes, piense en llevar a la persona a consultar a un podólogo. Dianne es un médico que se especializa en el cuidado de los pies. A veces un podólogo acudirá al [facility] si la persona no puede salir para las consultas. Trate de llevar a la persona a un salón de belleza para hacerse rebecca pedicura si eso es lo que él o robyn desea. Es rebecca oportunidad de salir y nazanin a la gente y de seguir haciendo rebecca actividad favorita. Usted puede realizar el cuidado básico de las uñas en el hogar. Por lo general, todo lo que necesita hacer es [employer] uñas limpias y de un [place]. ¿Cómo le recorta las uñas de los pies a Charmel Rein persona? Trate de recortarle las uñas a la persona cada semana. O examínelas cada semana para nazanin si necesitan recortarse. Es más fácil recortar las uñas después de que la persona se haya duchado o se haya lavado los pies. Cutten ablanda las uñas y las hace más fáciles de recortar. Comience reuniendo melvin suministros. Necesitará un cortaúñas para los pies y [place] de [place]. También podría necesitar esmalte para las uñas y Danica. Para recortar las uñas:  1. Lávese y [employer]. No necesita usar guantes. 2. Use quitaesmalte para eliminar el esmalte si lo hay.   3. Con rebecca mano, mantenga firme el pie y el dedo del pie de la persona mientras usted le recorta la uña con la [employer]. Recorte las uñas en línea recta y horizontal. Kya Jointer uñas un poco más largas en las esquinas para que los bordes afilados no se metan en la piel. 4. Manténgale las uñas a un sae que no vaya más allá de las puntas de los dedos del pie. 5. Deje que las uñas se sequen si todavía están húmedas y blandas. 6. Utilice rebecca lima de uñas para suavizar los bordes de las uñas, especialmente las esquinas. Podrían estar puntiagudas después de cortar las uñas en línea recta. 7. Aplique esmalte de uñas, si la persona lo desea. Si las uñas de la persona están gruesas y tienen un color anormal, puede ser más seguro dejar que las richard un podólogo. ¿Qué más necesita saber? Cuando está [employer] del cuidado de las uñas de rebecca persona, es importante recordar no recortar ni cortar las cutículas. Un pequeño richard en rebecca cutícula podría causar rebecca infección. Lávele los pies a diario en la ducha o el baño o en un recipiente hecho para pierre los pies. Es muy importante pierre los pies con cuidado si la persona tiene diabetes. Después de lavarle los pies, séquelos con cuidado. Aplíquele loción en los pies, especialmente en los talones. Kathy no se la aplique [place] dedos del pie. Si la persona no tiene diabetes y usted ve [address] de pie de atleta (clarence piel seca o agrietada, o comezón entre los dedos de los pies), puede probar un medicamento de The Pending sale to [facility]. Estos medicamentos pueden eliminar el hongo que causa el pie de atleta. Si el problema no desaparece, hable con el médico de la persona. Examine los pies a diario para nazanin si hay parks o señales de infección, clarence dolor, hinchazón, enrojecimiento o aumento de la temperatura. Si ve cualquiera de estas señales, especialmente en alguien que tiene diabetes, llame al médico.  ¿Dónde puede encontrar más información en inglés? Ofelia Marcus a http://rick-adrianna.info/.   July Hernandes R681 en la búsqueda para aprender más acerca de \"Aprenda sobre [place] Bound de los pies y de las uñas de los pies - [ [facility] ]. \"  Revisado: 18 sara, 2018  Versión del contenido: 11.9  © 2401-5113 Healthwise, Incorporated. Las instrucciones de cuidado fueron adaptadas bajo licencia por [employer] (which disclaims liability or warranty for this information). Si usted tiene Weston Alcalde afección médica o sobre estas instrucciones, siempre pregunte a ackerman profesional de nandini. Healthwise, Incorporated niega toda garantía o responsabilidad por ackerman uso de esta información.

## 2019-04-20 LAB
BUN SERPL-MCNC: 17 MG/DL (ref 6–24)
BUN/CREAT SERPL: 22 (ref 9–20)
CALCIUM SERPL-MCNC: 9.7 MG/DL (ref 8.7–10.2)
CHLORIDE SERPL-SCNC: 100 MMOL/L (ref 96–106)
CO2 SERPL-SCNC: 23 MMOL/L (ref 20–29)
CREAT SERPL-MCNC: 0.79 MG/DL (ref 0.76–1.27)
EST. AVERAGE GLUCOSE BLD GHB EST-MCNC: 235 MG/DL
GLUCOSE SERPL-MCNC: 434 MG/DL (ref 65–99)
HBA1C MFR BLD: 9.8 % (ref 4.8–5.6)
POTASSIUM SERPL-SCNC: 3.9 MMOL/L (ref 3.5–5.2)
SODIUM SERPL-SCNC: 138 MMOL/L (ref 134–144)

## 2019-04-23 ENCOUNTER — TELEPHONE (OUTPATIENT)
Dept: FAMILY MEDICINE CLINIC | Age: 44
End: 2019-04-23

## 2019-04-23 NOTE — PROGRESS NOTES
I reviewed with the resident the medical history and the resident's findings on the physical examination. I discussed with the resident the patient's diagnosis and concur with the plan. Poorly controlled DM. Needs close follow up for medication adjustment. Will discuss further w Dr. Maribel Rojas.

## 2019-04-23 NOTE — TELEPHONE ENCOUNTER
Called patient to discuss results. A1c at 9.8. Discussed with patient that I would  recommend insulin at this level. Patient declines. He would like to intensify his diet and exercise regimen. He admits that he sometimes does not take his evening Metformin dose. Counseled on importance of medications compliance. Start Jardiance 10mg daily, discontinue Glimepiride. Referral to Diabetic treatment center. Close follow up in 2 weeks.      Salome Anna MD

## 2019-04-24 ENCOUNTER — TELEPHONE (OUTPATIENT)
Dept: FAMILY MEDICINE CLINIC | Age: 44
End: 2019-04-24

## 2019-04-24 NOTE — TELEPHONE ENCOUNTER
Patient returning your call.  Patient asking that you call him on his lunch break between 12-12:30 pm.      thanks

## 2019-07-08 RX ORDER — EMPAGLIFLOZIN 10 MG/1
TABLET, FILM COATED ORAL
Qty: 30 TAB | Refills: 1 | Status: SHIPPED | OUTPATIENT
Start: 2019-07-08 | End: 2019-08-02 | Stop reason: SDUPTHER

## 2019-08-02 ENCOUNTER — OFFICE VISIT (OUTPATIENT)
Dept: FAMILY MEDICINE CLINIC | Age: 44
End: 2019-08-02

## 2019-08-02 VITALS
BODY MASS INDEX: 37.74 KG/M2 | OXYGEN SATURATION: 99 % | SYSTOLIC BLOOD PRESSURE: 146 MMHG | WEIGHT: 249 LBS | TEMPERATURE: 97.7 F | RESPIRATION RATE: 16 BRPM | HEIGHT: 68 IN | HEART RATE: 90 BPM | DIASTOLIC BLOOD PRESSURE: 94 MMHG

## 2019-08-02 DIAGNOSIS — E11.9 TYPE 2 DIABETES MELLITUS WITHOUT COMPLICATION, WITHOUT LONG-TERM CURRENT USE OF INSULIN (HCC): Primary | ICD-10-CM

## 2019-08-02 DIAGNOSIS — B35.6 TINEA CRURIS: ICD-10-CM

## 2019-08-02 DIAGNOSIS — I10 HTN (HYPERTENSION), BENIGN: ICD-10-CM

## 2019-08-02 DIAGNOSIS — E78.00 HIGH CHOLESTEROL: ICD-10-CM

## 2019-08-02 RX ORDER — METFORMIN HYDROCHLORIDE 1000 MG/1
1000 TABLET ORAL 2 TIMES DAILY WITH MEALS
Qty: 180 TAB | Refills: 1 | Status: SHIPPED | OUTPATIENT
Start: 2019-08-02 | End: 2020-03-25 | Stop reason: SDUPTHER

## 2019-08-02 RX ORDER — FENOFIBRATE 160 MG/1
TABLET ORAL
Qty: 90 TAB | Refills: 3 | Status: SHIPPED | OUTPATIENT
Start: 2019-08-02 | End: 2020-03-25 | Stop reason: SDUPTHER

## 2019-08-02 RX ORDER — VALSARTAN 40 MG/1
40 TABLET ORAL DAILY
Qty: 90 TAB | Refills: 3 | Status: SHIPPED | OUTPATIENT
Start: 2019-08-02 | End: 2020-03-25 | Stop reason: SDUPTHER

## 2019-08-02 RX ORDER — ATORVASTATIN CALCIUM 40 MG/1
TABLET, FILM COATED ORAL
Qty: 90 TAB | Refills: 3 | Status: SHIPPED | OUTPATIENT
Start: 2019-08-02 | End: 2020-03-25 | Stop reason: SDUPTHER

## 2019-08-02 RX ORDER — PRENATAL VIT 91/IRON/FOLIC/DHA 28-975-200
COMBINATION PACKAGE (EA) ORAL 2 TIMES DAILY
Qty: 30 G | Refills: 0 | Status: SHIPPED | OUTPATIENT
Start: 2019-08-02

## 2019-08-02 NOTE — PROGRESS NOTES
Wicho Tejeda is a 39 y.o. male here med refill. Pt reports that he is currently on Losartan 40mg daily but has been out for a month. Pt has been out of tiwn working and ran out of medication. DM: currently on Metformin 1000mg BID but has only been taking once a day due to running low, and Jardiance but also hasnt been taking due to being out of town. Was on Glimeparide but switched to [place]. HLD: On lipitor and Joeline [place] but also has been out of medication for a month     Diet and exercise: walks 20 min a day,  Eats lot of fish and chicken, lots of fruits        DIABETIC CARE CHECKLIST   1. Previous lab work reviewed 04/2019        - Last A1c: 9.8        - Last lipid panel: total cholesterol 172, LDL 64, Tri 330         - Last creatinine level: 0.99  2. Patient on ASA - Yes  3. Patient on Statin- Yes  4. Patient on ACE- On ARB  5. Patient attempting to follow diabetic diet - Yes   6. Activity log - No  7. Last eye check - hasn't seen one    8. Last comprehensive foot exam - 4/2019   9. Last flu shot: 2018  10. Last Pneumococcal vaccination: 2018      Data reviewed or ordered today:       Other problems include:  Patient Active Problem List   Diagnosis Code    Anaphylactic reaction T78. 2XXA    Type 2 diabetes mellitus without complication (HCC) L67.9    Dental caries K02.9    HTN (hypertension), benign I10    Hyperlipidemia E78.5       Medications:  Current Outpatient Medications   Medication Sig Dispense Refill    empagliflozin (JARDIANCE) 10 mg tablet TAKE 1 TABLET BY MOUTH EVERY DAY 30 Tab 5    metFORMIN (GLUCOPHAGE) 1,000 mg tablet Take 1 Tab by mouth two (2) times daily (with meals). 180 Tab 1    valsartan (DIOVAN) 40 mg tablet Take 1 Tab by mouth daily.  90 Tab 3    atorvastatin (LIPITOR) 40 mg tablet TAKE 1 TABLET BY MOUTH EVERY DAY 90 Tab 3    fenofibrate (LOFIBRA) 160 mg tablet TAKE 1 TABLET EVERY DAY 90 Tab 3    terbinafine HCl (LAMISIL) 1 % topical cream Apply  to affected area two (2) times a day. 30 g 0    EPINEPHrine (EPIPEN) 0.3 mg/0.3 mL injection 0.3 ML BY IM ROUTE ONCE AS NEEDED FOR UPTO 1 DOSE 2 Syringe 1    glucose blood VI test strips (ASCENSIA AUTODISC VI, ONE TOUCH ULTRA TEST VI) strip Use for blood glucose monitoring 50 Strip 5    Blood-Glucose Meter monitoring kit Check blood glucose 1 Kit 0    lancets misc Use with blood glucose monitor 1 Each 11    saw palmetto fruit 450 mg cap Take  by mouth.  Mv,Ca,Iron,Min-FA-Phytosterol (CENTRUM SPECIALIST HEART) 3-200-400 mg-mcg-mg tab Take  by mouth.  sildenafil citrate (VIAGRA) 25 mg tablet Take 1 Tab by mouth as needed. 20 Tab 3    aspirin 81 mg chewable tablet Take 81 mg by mouth daily.  omega-3 acid ethyl esters (LOVAZA) 1 gram capsule Take 1 g by mouth two (2) times a day.  cyclobenzaprine (FLEXERIL) 10 mg tablet Take  by mouth three (3) times daily as needed for Muscle Spasm(s). Allergies: Allergies   Allergen Reactions    Augmentin [Amoxicillin-Pot Clavulanate] Anaphylaxis    Augmentin [Amoxicillin-Pot Clavulanate] Anaphylaxis    Mucinex [Guaifenesin] Anaphylaxis and Swelling    Penicillins Anaphylaxis    Toradol [Ketorolac] Anaphylaxis    Penicillins Shortness of Breath    Pork/Porcine Containing Products Rash       LMP:  No LMP for male patient.     Social History     Socioeconomic History    Marital status:      Spouse name: Not on file    Number of children: Not on file    Years of education: Not on file    Highest education level: Not on file   Occupational History    Not on file   Social Needs    Financial resource strain: Not on file    Food insecurity:     Worry: Not on file     Inability: Not on file    Transportation needs:     Medical: Not on file     Non-medical: Not on file   Tobacco Use    Smoking status: Never Smoker    Smokeless tobacco: Never Used   Substance and Sexual Activity    Alcohol use: No    Drug use: No    Sexual activity: Yes     Partners: Female     Birth control/protection: None   Lifestyle    Physical activity:     Days per week: Not on file     Minutes per session: Not on file    Stress: Not on file   Relationships    Social connections:     Talks on phone: Not on file     Gets together: Not on file     Attends Mandaeism service: Not on file     Active member of club or organization: Not on file     Attends meetings of clubs or organizations: Not on file     Relationship status: Not on file    Intimate partner violence:     Fear of current or ex partner: Not on file     Emotionally abused: Not on file     Physically abused: Not on file     Forced sexual activity: Not on file   Other Topics Concern    Not on file   Social History Narrative    ** Merged History Encounter **         ** Merged History Encounter **            Family History   Problem Relation Age of Onset    No Known Problems Mother     No Known Problems Father     Hypertension Neg Hx     Diabetes Neg Hx            ROS:  Fever: no  Weight loss: no  Headaches:  no  Chest Pain:  no  SOB:  no  Cough:  no  Other significant ROS:        Physical Exam  Visit Vitals  BP (!) 146/94   Pulse 90   Temp 97.7 °F (36.5 °C) (Oral)   Resp 16   Ht 5' 7.9\" (1.725 m)   Wt 249 lb (112.9 kg)   SpO2 99%   BMI 37.97 kg/m²     Constitutional:  Appears well,  No acute distress, Vitals noted  Psychiatric:   Affect normal, Alert and Oriented to person/place/time  Eyes:   Pupils equally round and reactive, EOMI, conjunctiva clear  Neck:   General inspection and Thyroid normal.  No abnormal cervical or supraclavicular nodes. Lungs:   Clear to auscultation, good respiratory effort, no wheezes, rales or rhonchi  Heart:   Normal HR, Normal S1 and S2,  Regular rhythm. No cardiac murmurs. No carotid bruits.   Chest wall normal  Extremities:   without edema, good peripheral pulses  Skin:   Warm to palpation, without rashes, bruising, or suspicious lesions       BP Readings from Last 3 Encounters:   08/02/19 Arielle Angel ) 146/94   04/19/19 130/78   12/31/18 124/83       Assessment/Plan:      ICD-10-CM ICD-9-CM    1. Type 2 diabetes mellitus without complication, without long-term current use of insulin ([facility]) E11.9 250.00 empagliflozin (JARDIANCE) 10 mg tablet      metFORMIN (GLUCOPHAGE) 1,000 mg tablet      LIPID PANEL      HEMOGLOBIN A1C WITH EAG      MICROALBUMIN, UR, RAND W/ MICROALB/CREAT RATIO      METABOLIC PANEL, BASIC   2. HTN (hypertension), benign I10 401.1 valsartan (DIOVAN) 40 mg tablet   3. High cholesterol E78.00 272.0 atorvastatin (LIPITOR) 40 mg tablet      fenofibrate (LOFIBRA) 160 mg tablet      LIPID PANEL   4. Tinea cruris B35.6 110.3 terbinafine HCl (LAMISIL) 1 % topical cream     1. Type 2 diabetes mellitus without complication, without long-term current use of insulin [facility] will send with 6 month Rx pt to follow up in 3 months for close monitoring of DM management   - empagliflozin (JARDIANCE) 10 mg tablet; TAKE 1 TABLET BY MOUTH EVERY DAY  Dispense: 30 Tab; Refill: 5  - metFORMIN (GLUCOPHAGE) 1,000 mg tablet; Take 1 Tab by mouth two (2) times daily (with meals). Dispense: 180 Tab; Refill: 1  - LIPID PANEL  - HEMOGLOBIN A1C WITH EAG  - MICROALBUMIN, UR, RAND W/ MICROALB/CREAT RATIO  - METABOLIC PANEL, BASIC  - Info given for Ophthalmology referral      2. HTN (hypertension), benign  - valsartan (DIOVAN) 40 mg tablet; Take 1 Tab by mouth daily. Dispense: 90 Tab; Refill: 3  - BP elevated today likely 2/2 to medication noncompliance     3. High cholesterol  - atorvastatin (LIPITOR) 40 mg tablet; TAKE 1 TABLET BY MOUTH EVERY DAY  Dispense: 90 Tab; Refill: 3  - fenofibrate (LOFIBRA) 160 mg tablet; TAKE 1 TABLET EVERY DAY  Dispense: 90 Tab; Refill: 3  - LIPID PANEL    4. Tinea cruris- No currently w/ sxs, but reports that he occasionally gets rashes when he is away on work contracts. - terbinafine HCl (LAMISIL) 1 % topical cream; Apply  to affected area two (2) times a day.   Dispense: 30 g; Refill: 0    Orders Placed This Encounter    LIPID PANEL    HEMOGLOBIN A1C WITH EAG    MICROALBUMIN, UR, RAND W/ MICROALB/CREAT RATIO    METABOLIC PANEL, BASIC    empagliflozin (JARDIANCE) 10 mg tablet     Sig: TAKE 1 TABLET BY MOUTH EVERY DAY     Dispense:  30 Tab     Refill:  5    metFORMIN (GLUCOPHAGE) 1,000 mg tablet     Sig: Take 1 Tab by mouth two (2) times daily (with meals). Dispense:  180 Tab     Refill:  1    valsartan (DIOVAN) 40 mg tablet     Sig: Take 1 Tab by mouth daily. Dispense:  90 Tab     Refill:  3    atorvastatin (LIPITOR) 40 mg tablet     Sig: TAKE 1 TABLET BY MOUTH EVERY DAY     Dispense:  90 Tab     Refill:  3    fenofibrate (LOFIBRA) 160 mg tablet     Sig: TAKE 1 TABLET EVERY DAY     Dispense:  90 Tab     Refill:  3    terbinafine HCl (LAMISIL) 1 % topical cream     Sig: Apply  to affected area two (2) times a day.      Dispense:  30 g     Refill:  0         Dora Harvey MD  [address] [facility]

## 2019-08-02 NOTE — PATIENT INSTRUCTIONS
[facility] Ophthalmology  Address: [place] [address] [address] [facility] [address]  [phone]    Consejos de nutrición para la diabetes: Después de la consulta  [Nutrition Tips for Diabetes: After Your Visit]  Instrucciones de cuidado  Rebecca dieta saludable es importante para el manejo de la diabetes. Puede ayudarle a bajar de peso (si lo necesita) y a no recuperarlo. Esta dieta le da los nutrientes y la energía que ackerman cuerpo necesita y le ayuda a prevenir enfermedades cardíacas (del corazón). Sin embargo, esto no significa que en rebecca dieta para personas con diabetes usted tenga que consumir alimentos especiales. Usted [employer] mismos alimentos que ackerman poncho, incluso dulces ocasionalmente y otros alimentos favoritos. Kathy debe tener en cuenta la cantidad y la frecuencia con que come ciertos alimentos. El plan correcto para usted incluirá alimentos que le ayuden a mantener ackerman nivel de azúcar en la savannah en límites sanos. Trate de comer alimentos variados y [employer] carbohidratos radha todo el día. Los carbohidratos aumentan el nivel de azúcar en la savannah y lo hacen con mayor rapidez que otros nutrientes. Estos pueden encontrarse en el azúcar, los panes y cereales, las frutas, en los vegetales con almidones, clarence las michell y [place], y en la [employer] y el yogur. Sería conveniente que colabore con un dietista o educador de diabetes para que le ayuden a planificar las comidas y los refrigerios. Si leanna de melvin objetivos es la pérdida de [place], un dietista o educador de diabetes puede ayudarle. Los consejos que siguen a continuación pueden ayudarle a disfrutar melvin comidas y [employer]. La atención de seguimiento es rebecca parte clave de ackerman tratamiento y seguridad. Asegúrese de hacer y acudir a todas las citas, y llame a ackerman médico si está teniendo problemas. También es rebecca buena idea saber los resultados de los exámenes y mantener rebecca lista de los medicamentos que javier.   ¿Cómo puede cuidarse en el hogar?  · Aprenda a distinguir los alimentos con carbohidratos y la cantidad de carbohidratos que debe consumir. Un dietista o educador de diabetes puede enseñarle a llevar un control de la cantidad de carbohidratos que consume. · Distribuya los carbohidratos a lo sea del día. Consuma rebecca pequeña porción de carbohidratos en cada rebecca de las comidas, tasha no demasiado de rebecca chris vez. · Planifique melvin comidas para que incluyan alimentos de todos los grupos alimentarios. Estos son Nancyann Sleight y algunos ejemplos de tamaños de porciones:  ¨ Granos: 1 rebanada de pan (1 onza o 28 g), ½ taza de cereal cocido y 1/3 de taza de pasta o arroz cocidos. Estas raciones contienen alrededor de 15 gramos de carbohidratos por porción. Elija comer granos enteros, clarence pan o galletas saladas integrales, silvano y [place] integral, con mayor frecuencia que granos refinados. ¨ Frutas: 1 fruta pequeña fresca, clarence rebecca manzana o rebecca naranja; ½ banana (plátano); ½ taza de fruta picada, cocida o enlatada; ½ taza de jugo de fruta; 1 taza de melón o frambuesa; y 2 cucharadas de frutas secas. Estas raciones contienen alrededor de 15 gramos de carbohidratos por porción. ¨ Productos lácteos: 1 taza de [address] y 2/3 de taza de yogur natural. Estas raciones contienen alrededor de 15 gramos de carbohidratos por porción. ¨ Proteínas: Carne de froylan, nelia, pavo, pescado, huevos, tofu, queso, requesón y [address] de c[place]). Rebecca porción de carne es 3 onzas, que es aproximadamente el tamaño de rebecca baraja de naipes. Ejemplos de porciones de sustitutos de la carne (igual a 1 onza de carne) son 1/4 de taza de requesón, 1 huevo, 1 cucharada de [address] de cacahuate y ½ taza de tofu. Estos alimentos contienen muy poca cantidad o nada de carbohidratos por porción.   ¨ Vegetales: Los vegetales con almidón clarence ½ taza de frijoles (habichuelas) secos cocidos, arvejas (chícharos), michell o maíz contienen alrededor de 15 gramos de carbohidratos. Los vegetales sin almidón contienen muy pocos carbohidratos, clarence 1 taza de verduras de hojas verdes crudas (clarence la espinaca), ½ taza de otro vegetal (cocido o vikki) y 3/4 de taza de jugo de verduras. · Use el formato del plato para planificar melvin comidas. Es rebecca Korina Salm y rápida de asegurarse de que consuma comidas balanceadas. También le ayuda a distribuir los carbohidratos radha el día. Divida el plato por tipo de alimento. [address] verduras en medio plato, la carne o melvin sustitutos en un cuarto del plato y los granos o verduras con almidones (clarence arroz integral o rebecca papa) en la cuarta parte restante del plato. A esto puede agregarle un pequeño trozo de fruta y [place] taza de [place] o yogur, según la cantidad de carbohidratos que deba consumir en rebecca comida. · Consulte a ackerman dietista o educador de diabetes sobre las formas de añadir cantidades limitadas de dulces en ackerman plan alimenticio. Usted puede comer estos alimentos de vez en cuando, siempre que incluya la cantidad de carbohidratos que contienen en la cantidad permitida diaria. · Si matt alcohol, limite el consumo a no más que 1 bebida al día si es jhonny y 2 bebidas al día si es hombre. Si está embarazada, ninguna cantidad de alcohol es sarkar. · Las proteínas, grasas y fibras no aumentan tanto el nivel de azúcar en la savannah clarence los carbohidratos. Si consume muchos de estos nutrientes en rebecca comida, el azúcar en la savannah aumentará con mayor lentitud de lo que lo haría de Maria A u Cristian. · Limite las grasas saturadas, clarence las de las joan y los productos lácteos. Trate de reemplazarlos con grasa monoinsaturada clarence, por ejemplo, el aceite de [place]. Esta es rebecca opción más saludable porque las personas con diabetes tienen un riesgo superior al promedio de enfermedad cardíaca. De todos modos, use rebecca cantidad [facility] de [place].  Rebecca cucharada de aceite de [place] contiene 14 gramos de grasa y 120 calorías. · Hacer ejercicio disminuye el nivel de azúcar en la savannah. Si usted se Shana-Nadja dosis de [employer] de inyecciones o [place] bomba, puede usar rebecca dosis ernst que si no hace ejercicio. Tenga en cuenta que los horarios son importantes. Si usted hace ejercicio 1 hora después de bianca comido, es posible que ackerman cuerpo necesite menos insulina que si lo hace 3 horas después de la comida. Mida ackerman nivel de azúcar en la savannah para saber cómo afecta el ejercicio ackerman necesidad de insulina. · Sigrid ejercicio la mayoría de los días de la [place]. Sigrid por lo menos 30 minutos al día. El ejercicio le ayuda a mantener un peso saludable y a que ackerman cuerpo use la insulina. Caminar es rebecca manera fácil de hacer ejercicio. Aumente en forma gradual la distancia que camina cada día. Quizás también desee nadar, montar en bicicleta o hacer otras actividades. Cuando come afuera  · Aprenda a [employer] de las porciones de alimentos que contienen carbohidratos. Si mide melvin alimentos en el hogar, será más fácil calcular la cantidad de carbohidratos en la porción del restaurante. · Si la comida que pide contiene demasiados carbohidratos (clarence michell, maíz o frijoles horneados), pida en ackerman lugar rebecca comida baja en carbohidratos. Pida rebecca ensalada o vegetales verdes. · Si [place] insulina, mídase el nivel de azúcar antes y después de salir a comer afuera para saber la cantidad que puede comer en el futuro. · Si consume más carbohidratos de lo planeado en rebecca comida, camine o sigrid ejercicio. Emerado le ayudará a bajar el nivel de azúcar en la savannah. ¿Dónde puede encontrar más información en inglés? Joseline Sonny a DealExplorer.irina Rogel [id number] en la búsqueda para aprender más acerca de \"Consejos de nutrición para la diabetes: Después de la consulta. \"   © 9949-3387 Healthwise, Incorporated.  Instrucciones de cuidado adaptadas bajo licencia por [facility] (which disclaims liability or warranty for this information). Estas instrucciones de cuidado son para usarlas con ackerman profesional clínico registrado. Si tiene preguntas acerca de rebecca afección médica o de estas instrucciones, pregunte siempre a ackerman profesional de [employer]. Healthwise, Incorporated niega cualquier garantía o responsabilidad por ackerman uso de esta información.   Versión del contenido: [id number]; Revisado: 6 agosto, 2013

## 2019-08-03 LAB
ALBUMIN/CREAT UR: 270.8 MG/G CREAT (ref 0–30)
BUN SERPL-MCNC: 15 MG/DL (ref 6–24)
BUN/CREAT SERPL: 21 (ref 9–20)
CALCIUM SERPL-MCNC: 9.9 MG/DL (ref 8.7–10.2)
CHLORIDE SERPL-SCNC: 100 MMOL/L (ref 96–106)
CHOLEST SERPL-MCNC: 252 MG/DL (ref 100–199)
CO2 SERPL-SCNC: 23 MMOL/L (ref 20–29)
CREAT SERPL-MCNC: 0.73 MG/DL (ref 0.76–1.27)
CREAT UR-MCNC: 43.1 MG/DL
EST. AVERAGE GLUCOSE BLD GHB EST-MCNC: 275 MG/DL
GLUCOSE SERPL-MCNC: 326 MG/DL (ref 65–99)
HBA1C MFR BLD: 11.2 % (ref 4.8–5.6)
HDLC SERPL-MCNC: 35 MG/DL
INTERPRETATION, 910389: NORMAL
LDLC SERPL CALC-MCNC: ABNORMAL MG/DL (ref 0–99)
Lab: NORMAL
Lab: NORMAL
MICROALBUMIN UR-MCNC: 116.7 UG/ML
POTASSIUM SERPL-SCNC: 4.3 MMOL/L (ref 3.5–5.2)
SODIUM SERPL-SCNC: 139 MMOL/L (ref 134–144)
TRIGL SERPL-MCNC: 663 MG/DL (ref 0–149)
VLDLC SERPL CALC-MCNC: ABNORMAL MG/DL (ref 5–40)

## 2019-08-06 ENCOUNTER — TELEPHONE (OUTPATIENT)
Dept: FAMILY MEDICINE CLINIC | Age: 44
End: 2019-08-06

## 2019-08-06 NOTE — PROGRESS NOTES
Worsening of Triglycerides and chol. A1c also worsened 9.8--> 11.2 all likely due to medication noncompliance as pt ran out of medication > 1 month ago. Will call pt with results and to stress compliance Rx sent at recent visit.  Pt will need to follow up in 3 months to monitor conditions closely

## 2019-08-13 ENCOUNTER — TELEPHONE (OUTPATIENT)
Dept: FAMILY MEDICINE CLINIC | Age: 44
End: 2019-08-13

## 2019-08-13 NOTE — TELEPHONE ENCOUNTER
Have tried multiple attempts to contact pt with his recent lab results. Left voicemail to call clinic back to receive results.  Pt should be seen 3 months from recent appointment

## 2020-03-24 ENCOUNTER — TELEPHONE (OUTPATIENT)
Dept: FAMILY MEDICINE CLINIC | Age: 45
End: 2020-03-24

## 2020-03-24 DIAGNOSIS — I10 HTN (HYPERTENSION), BENIGN: ICD-10-CM

## 2020-03-24 DIAGNOSIS — E78.00 HIGH CHOLESTEROL: ICD-10-CM

## 2020-03-24 DIAGNOSIS — E11.9 TYPE 2 DIABETES MELLITUS WITHOUT COMPLICATION, WITHOUT LONG-TERM CURRENT USE OF INSULIN (HCC): ICD-10-CM

## 2020-03-24 NOTE — TELEPHONE ENCOUNTER
----- Message from Lacy Garcia sent at 3/24/2020 12:36 PM EDT -----  Regarding: Dr. Fisher Rather: [phone]  Name of medication and dosage if known: Metformin-1000mg,  bp medication(name unknown)  Is patient out of this medication (yes/no): Yes    Pharmacy name: [id number] listed in chart? (yes/no): Yes  Pharmacy phone number: [phone]  Date of last visit:   Friday, August 02, 2019 08:15 AM    Details to clarify the request: Pt would like a refill is his medication.

## 2020-03-25 RX ORDER — ATORVASTATIN CALCIUM 40 MG/1
TABLET, FILM COATED ORAL
Qty: 90 TAB | Refills: 1 | Status: SHIPPED | OUTPATIENT
Start: 2020-03-25 | End: 2022-07-15 | Stop reason: SDUPTHER

## 2020-03-25 RX ORDER — VALSARTAN 40 MG/1
40 TABLET ORAL DAILY
Qty: 90 TAB | Refills: 1 | Status: SHIPPED | OUTPATIENT
Start: 2020-03-25 | End: 2022-07-15 | Stop reason: SDUPTHER

## 2020-03-25 RX ORDER — METFORMIN HYDROCHLORIDE 1000 MG/1
1000 TABLET ORAL 2 TIMES DAILY WITH MEALS
Qty: 180 TAB | Refills: 1 | Status: SHIPPED | OUTPATIENT
Start: 2020-03-25 | End: 2020-09-21

## 2020-03-25 RX ORDER — FENOFIBRATE 160 MG/1
TABLET ORAL
Qty: 90 TAB | Refills: 1 | Status: SHIPPED | OUTPATIENT
Start: 2020-03-25 | End: 2022-07-15 | Stop reason: SDUPTHER

## 2020-05-20 RX ORDER — EPINEPHRINE 0.3 MG/.3ML
INJECTION SUBCUTANEOUS
Qty: 1 SYRINGE | Refills: 1 | Status: SHIPPED | OUTPATIENT
Start: 2020-05-20 | End: 2022-05-09 | Stop reason: SDUPTHER

## 2020-05-21 DIAGNOSIS — E11.9 TYPE 2 DIABETES MELLITUS WITHOUT COMPLICATION, WITHOUT LONG-TERM CURRENT USE OF INSULIN (HCC): ICD-10-CM

## 2020-07-13 ENCOUNTER — TELEPHONE (OUTPATIENT)
Dept: FAMILY MEDICINE CLINIC | Age: 45
End: 2020-07-13

## 2020-07-13 NOTE — TELEPHONE ENCOUNTER
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       Genesis Junior

## 2022-03-19 PROBLEM — K02.9 DENTAL CARIES: Status: ACTIVE | Noted: 2017-11-01

## 2022-03-19 PROBLEM — I10 HTN (HYPERTENSION), BENIGN: Status: ACTIVE | Noted: 2017-11-01

## 2022-03-19 PROBLEM — E11.9 TYPE 2 DIABETES MELLITUS WITHOUT COMPLICATION (HCC): Status: ACTIVE | Noted: 2017-11-01

## 2022-03-19 PROBLEM — T78.2XXA ANAPHYLACTIC REACTION: Status: ACTIVE | Noted: 2017-11-01

## 2022-03-20 PROBLEM — E78.5 HYPERLIPIDEMIA: Status: ACTIVE | Noted: 2017-11-01

## 2022-05-09 ENCOUNTER — OFFICE VISIT (OUTPATIENT)
Dept: ENDOCRINOLOGY | Age: 47
End: 2022-05-09

## 2022-05-09 VITALS
BODY MASS INDEX: 30.35 KG/M2 | DIASTOLIC BLOOD PRESSURE: 77 MMHG | HEIGHT: 70 IN | RESPIRATION RATE: 16 BRPM | WEIGHT: 212 LBS | HEART RATE: 79 BPM | OXYGEN SATURATION: 100 % | SYSTOLIC BLOOD PRESSURE: 120 MMHG

## 2022-05-09 DIAGNOSIS — E11.9 TYPE 2 DIABETES MELLITUS WITHOUT COMPLICATION, WITHOUT LONG-TERM CURRENT USE OF INSULIN (HCC): Primary | ICD-10-CM

## 2022-05-09 PROCEDURE — 99204 OFFICE O/P NEW MOD 45 MIN: CPT | Performed by: INTERNAL MEDICINE

## 2022-05-09 RX ORDER — EPINEPHRINE 0.3 MG/.3ML
INJECTION SUBCUTANEOUS
Qty: 1 EACH | Refills: 5 | Status: SHIPPED | OUTPATIENT
Start: 2022-05-09

## 2022-05-09 RX ORDER — LANCETS
EACH MISCELLANEOUS
Qty: 200 EACH | Refills: 11 | Status: SHIPPED | OUTPATIENT
Start: 2022-05-09

## 2022-05-09 RX ORDER — IBUPROFEN 200 MG
CAPSULE ORAL
Qty: 200 STRIP | Refills: 11 | Status: SHIPPED | OUTPATIENT
Start: 2022-05-09

## 2022-05-09 RX ORDER — INSULIN PUMP SYRINGE, 3 ML
EACH MISCELLANEOUS
Qty: 1 KIT | Refills: 0 | Status: SHIPPED | OUTPATIENT
Start: 2022-05-09 | End: 2022-06-17
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
## 2022-06-17 RX ORDER — BLOOD-GLUCOSE METER
EACH MISCELLANEOUS
Qty: 1 EACH | Refills: 0 | Status: SHIPPED | OUTPATIENT
Start: 2022-06-17 | End: 2022-07-20

## 2022-07-14 ENCOUNTER — TELEPHONE (OUTPATIENT)
Dept: ENDOCRINOLOGY | Age: 47
End: 2022-07-14

## 2022-07-14 NOTE — TELEPHONE ENCOUNTER
7/14/2022  11:26 AM    Pt called and stated that his refill for his valsartan and one other med was denied by dr Madelin Pagan and he wants to know why please call him back at [phone] thanks

## 2022-07-14 NOTE — TELEPHONE ENCOUNTER
I never received a refill request for any meds.      Brittani Mcfadden, [address] [facility]

## 2022-07-14 NOTE — TELEPHONE ENCOUNTER
I tried to reach via phone pt but I was not successfully and his voicemail is not setup to receive messages.

## 2022-07-15 DIAGNOSIS — E11.9 TYPE 2 DIABETES MELLITUS WITHOUT COMPLICATION, WITHOUT LONG-TERM CURRENT USE OF INSULIN (HCC): ICD-10-CM

## 2022-07-15 DIAGNOSIS — E78.00 HIGH CHOLESTEROL: ICD-10-CM

## 2022-07-15 DIAGNOSIS — I10 HTN (HYPERTENSION), BENIGN: ICD-10-CM

## 2022-07-15 RX ORDER — ATORVASTATIN CALCIUM 40 MG/1
TABLET, FILM COATED ORAL
Qty: 90 TABLET | Refills: 1 | Status: SHIPPED | OUTPATIENT
Start: 2022-07-15

## 2022-07-15 RX ORDER — METFORMIN HYDROCHLORIDE 1000 MG/1
1000 TABLET ORAL 2 TIMES DAILY WITH MEALS
Qty: 180 TABLET | Refills: 2 | Status: SHIPPED | OUTPATIENT
Start: 2022-07-15

## 2022-07-15 RX ORDER — VALSARTAN 40 MG/1
40 TABLET ORAL DAILY
Qty: 90 TABLET | Refills: 1 | Status: SHIPPED | OUTPATIENT
Start: 2022-07-15

## 2022-07-15 RX ORDER — FENOFIBRATE 160 MG/1
TABLET ORAL
Qty: 90 TABLET | Refills: 1 | Status: SHIPPED | OUTPATIENT
Start: 2022-07-15

## 2022-07-20 RX ORDER — BLOOD-GLUCOSE METER
EACH MISCELLANEOUS
Qty: 1 EACH | Refills: 0 | Status: SHIPPED | OUTPATIENT
Start: 2022-07-20 | End: 2022-08-25

## 2022-08-25 RX ORDER — BLOOD-GLUCOSE METER
EACH MISCELLANEOUS
Qty: 1 EACH | Refills: 1 | Status: SHIPPED | OUTPATIENT
Start: 2022-08-25

## 2023-04-01 ENCOUNTER — HOSPITAL ENCOUNTER (EMERGENCY)
Age: 48
Discharge: HOME | End: 2023-04-01
Attending: STUDENT IN AN ORGANIZED HEALTH CARE EDUCATION/TRAINING PROGRAM
Payer: COMMERCIAL

## 2023-04-01 VITALS
BODY MASS INDEX: 31.5 KG/M2 | SYSTOLIC BLOOD PRESSURE: 128 MMHG | RESPIRATION RATE: 18 BRPM | HEIGHT: 70 IN | DIASTOLIC BLOOD PRESSURE: 81 MMHG | OXYGEN SATURATION: 96 % | WEIGHT: 220 LBS | HEART RATE: 95 BPM | TEMPERATURE: 98 F

## 2023-04-01 DIAGNOSIS — T78.2XXA ANAPHYLAXIS, INITIAL ENCOUNTER: Primary | ICD-10-CM

## 2023-04-01 LAB
ALBUMIN SERPL-MCNC: 4.5 G/DL (ref 3.5–5.2)
ALBUMIN/GLOB SERPL: 1.5 (ref 1.1–2.2)
ALP SERPL-CCNC: 146 U/L (ref 40–129)
ALT SERPL-CCNC: 32 U/L (ref 10–50)
ANION GAP SERPL CALC-SCNC: 14 MMOL/L (ref 5–15)
AST SERPL-CCNC: 25 U/L (ref 10–50)
BASOPHILS # BLD: 0 K/UL (ref 0–0.1)
BASOPHILS NFR BLD: 0 % (ref 0–1)
BILIRUB SERPL-MCNC: 0.4 MG/DL (ref 0.2–1)
BUN SERPL-MCNC: 29 MG/DL (ref 6–20)
BUN/CREAT SERPL: 36 (ref 12–20)
CALCIUM SERPL-MCNC: 10.4 MG/DL (ref 8.6–10)
CHLORIDE SERPL-SCNC: 104 MMOL/L (ref 98–107)
CO2 SERPL-SCNC: 22 MMOL/L (ref 22–29)
CREAT SERPL-MCNC: 0.81 MG/DL (ref 0.7–1.2)
DIFFERENTIAL METHOD BLD: ABNORMAL
EOSINOPHIL # BLD: 0.1 K/UL (ref 0–0.4)
EOSINOPHIL NFR BLD: 1 % (ref 0–7)
ERYTHROCYTE [DISTWIDTH] IN BLOOD BY AUTOMATED COUNT: 14.6 % (ref 11.5–14.5)
GLOBULIN SER CALC-MCNC: 3 G/DL (ref 2–4)
GLUCOSE SERPL-MCNC: 237 MG/DL (ref 65–100)
HCT VFR BLD AUTO: 49.3 % (ref 36.6–50.3)
HGB BLD-MCNC: 16.9 G/DL (ref 12.1–17)
IMM GRANULOCYTES # BLD AUTO: 0.1 K/UL (ref 0–0.04)
IMM GRANULOCYTES NFR BLD AUTO: 1 % (ref 0–0.5)
LYMPHOCYTES # BLD: 2.4 K/UL (ref 0.8–3.5)
LYMPHOCYTES NFR BLD: 25 % (ref 12–49)
MCH RBC QN AUTO: 29.5 PG (ref 26–34)
MCHC RBC AUTO-ENTMCNC: 34.3 G/DL (ref 30–36.5)
MCV RBC AUTO: 86.2 FL (ref 80–99)
MONOCYTES # BLD: 0.5 K/UL (ref 0–1)
MONOCYTES NFR BLD: 5 % (ref 5–13)
NEUTS SEG # BLD: 6.7 K/UL (ref 1.8–8)
NEUTS SEG NFR BLD: 69 % (ref 32–75)
NRBC # BLD: 0 K/UL (ref 0–0.01)
NRBC BLD-RTO: 0 PER 100 WBC
PLATELET # BLD AUTO: 203 K/UL (ref 150–400)
POTASSIUM SERPL-SCNC: 3.9 MMOL/L (ref 3.5–5.1)
PROT SERPL-MCNC: 7.5 G/DL (ref 6.4–8.3)
RBC # BLD AUTO: 5.72 M/UL (ref 4.1–5.7)
SODIUM SERPL-SCNC: 140 MMOL/L (ref 136–145)
WBC # BLD AUTO: 9.7 K/UL (ref 4.1–11.1)

## 2023-04-01 PROCEDURE — 74011250636 HC RX REV CODE- 250/636

## 2023-04-01 PROCEDURE — 99284 EMERGENCY DEPT VISIT MOD MDM: CPT

## 2023-04-01 PROCEDURE — 96374 THER/PROPH/DIAG INJ IV PUSH: CPT

## 2023-04-01 PROCEDURE — 96372 THER/PROPH/DIAG INJ SC/IM: CPT

## 2023-04-01 PROCEDURE — 36415 COLL VENOUS BLD VENIPUNCTURE: CPT

## 2023-04-01 PROCEDURE — 96375 TX/PRO/DX INJ NEW DRUG ADDON: CPT

## 2023-04-01 PROCEDURE — 85025 COMPLETE CBC W/AUTO DIFF WBC: CPT

## 2023-04-01 PROCEDURE — 93005 ELECTROCARDIOGRAM TRACING: CPT

## 2023-04-01 PROCEDURE — 74011250636 HC RX REV CODE- 250/636: Performed by: NURSE PRACTITIONER

## 2023-04-01 PROCEDURE — 80053 COMPREHEN METABOLIC PANEL: CPT

## 2023-04-01 PROCEDURE — 74011000250 HC RX REV CODE- 250: Performed by: NURSE PRACTITIONER

## 2023-04-01 RX ORDER — FAMOTIDINE 20 MG/1
20 TABLET, FILM COATED ORAL 2 TIMES DAILY
Qty: 20 TABLET | Refills: 0 | Status: SHIPPED | OUTPATIENT
Start: 2023-04-01 | End: 2023-04-11

## 2023-04-01 RX ORDER — PREDNISONE 5 MG/1
TABLET ORAL
Qty: 21 TABLET | Refills: 0 | Status: SHIPPED | OUTPATIENT
Start: 2023-04-01

## 2023-04-01 RX ORDER — EPINEPHRINE 0.3 MG/.3ML
0.3 INJECTION SUBCUTANEOUS
Qty: 1 EACH | Refills: 0 | Status: SHIPPED | OUTPATIENT
Start: 2023-04-01 | End: 2023-04-01

## 2023-04-01 RX ORDER — FAMOTIDINE 10 MG/ML
INJECTION INTRAVENOUS
Status: COMPLETED
Start: 2023-04-01 | End: 2023-04-01

## 2023-04-01 RX ORDER — CETIRIZINE HYDROCHLORIDE 10 MG/1
10 TABLET ORAL DAILY
Qty: 10 TABLET | Refills: 0 | Status: SHIPPED | OUTPATIENT
Start: 2023-04-01

## 2023-04-01 RX ORDER — DIPHENHYDRAMINE HYDROCHLORIDE 50 MG/ML
INJECTION, SOLUTION INTRAMUSCULAR; INTRAVENOUS
Status: DISCONTINUED
Start: 2023-04-01 | End: 2023-04-01 | Stop reason: WASHOUT

## 2023-04-01 RX ORDER — EPINEPHRINE 1 MG/ML
0.3 INJECTION, SOLUTION, CONCENTRATE INTRAVENOUS
Status: COMPLETED | OUTPATIENT
Start: 2023-04-01 | End: 2023-04-01

## 2023-04-01 RX ORDER — EPINEPHRINE 1 MG/ML
INJECTION, SOLUTION, CONCENTRATE INTRAVENOUS
Status: COMPLETED
Start: 2023-04-01 | End: 2023-04-01

## 2023-04-01 RX ADMIN — EPINEPHRINE 0.3 MG: 1 INJECTION, SOLUTION, CONCENTRATE INTRAVENOUS at 10:19

## 2023-04-01 RX ADMIN — METHYLPREDNISOLONE SODIUM SUCCINATE 125 MG: 125 INJECTION, POWDER, FOR SOLUTION INTRAMUSCULAR; INTRAVENOUS at 10:28

## 2023-04-01 RX ADMIN — SODIUM CHLORIDE 1000 ML: 9 INJECTION, SOLUTION INTRAVENOUS at 10:26

## 2023-04-01 RX ADMIN — SODIUM CHLORIDE, PRESERVATIVE FREE 20 MG: 5 INJECTION INTRAVENOUS at 10:29

## 2023-04-01 RX ADMIN — FAMOTIDINE: 10 INJECTION, SOLUTION INTRAVENOUS at 10:29

## 2023-04-01 NOTE — ED NOTES
In room to reassess pt. Pt rash has resolved. No facial swelling, no tongue swelling noted. Pt reports b/l nose burning and some \"fullness in his throat. \" remains on cardiac monitor. NSR on ECG. Will continue to monitor.

## 2023-04-01 NOTE — DISCHARGE INSTRUCTIONS
Thank you for allowing us to provide you with medical care today. We realize that you have many choices for your emergency care needs. We thank you for choosing [facility]. Please choose us in the future for any continued health care needs. The exam and treatment you received in the emergency department were for an emergent problem and are not intended as complete care. It is important that you follow-up with a doctor. If your symptoms worsen or you do not improve you should return to the emergency department. We are available 24 hours a day. Please make an appointment with your health care provider for follow-up of your emergency department visit. Take this sheet with you when you go to your follow-up visit.

## 2023-04-01 NOTE — Clinical Note
[address]  [facility]ERGENCY DEPT  [address] [phone]  [phone]    Work/School Note    Date: 4/1/2023    To Whom It May concern:    Lexi Lundberg was seen and treated today in the emergency room by the following provider(s):  Attending Provider: Mario He DO  Nurse Practitioner: Giancarlo Sharpe NP. Lexi Lundberg is excused from work/school on 04/01/23 and 04/02/23. He is medically clear to return to work/school on 4/3/2023.        Sincerely,          Dc Kim NP

## 2023-04-01 NOTE — ED TRIAGE NOTES
PT reports breathing in some dust at work and started having allergic reaction, rash, turning red, with SOB

## 2023-04-01 NOTE — ED PROVIDER NOTES
HPI   Patient is a 49 y.o. M who presents today with complaints of allergic reaction. 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  He quickly developed shortness of breath and a full body rash. Denies tongue swelling, but states it feels like it is becoming more difficult to swallow. Denies nausea vomiting diarrhea. He states his he has had no reaction like this in the past but has not occurred for a long time. States only medication he is currently taking his metformin he has been on this for a while. Denies any other new soaps, detergents, or body products. Had a similar reaction 3 years ago when given penicillin, previously prescribed an EpiPen but did not have it on him. States he took 50 mg of Benadryl and then came to the emergency department. There are no other complaints, changes or physical findings at this time.       ALLERGIES: Augmentin [amoxicillin-pot clavulanate], Augmentin [amoxicillin-pot clavulanate], Mucinex [guaifenesin], Penicillins, Toradol [ketorolac], Penicillins, and Pork/porcine containing products    Past Medical History:   Diagnosis Date    Diabetes ([facility]     DM type 2 (diabetes mellitus, type 2) ([facility]     High blood pressure     High cholesterol     HTN (hypertension), benign     Hypercholesterolemia     Hypertension      Past Surgical History:   Procedure Laterality Date    HX HEENT      left eye surgery    HX ORTHOPAEDIC      left hand surgery     Family History:   Problem Relation Age of Onset    No Known Problems Mother     No Known Problems Father     Hypertension Neg Hx     Diabetes Neg Hx      Social History     Socioeconomic History    Marital status:      Spouse name: Not on file    Number of children: Not on file    Years of education: Not on file    Highest education level: Not on file   Occupational History    Not on file   Tobacco Use    Smoking status: Never    Smokeless tobacco: Never   Vaping Use    Vaping Use: Never used Substance and Sexual Activity    Alcohol use: No    Drug use: No    Sexual activity: Yes     Partners: Female     Birth control/protection: None   Other Topics Concern    Not on file   Social History Narrative    ** Merged History Encounter **         ** Merged History Encounter **          Social Determinants of Health     Financial Resource Strain: Not on file   Food Insecurity: Not on file   Transportation Needs: Not on file   Physical Activity: Not on file   Stress: Not on file   Social Connections: Not on file   Intimate Partner Violence: Not on file   Housing Stability: Not on file           Review of Systems   Constitutional:  Negative for fever. HENT:  Positive for trouble swallowing. Negative for congestion. Eyes:  Negative for visual disturbance. Respiratory:  Positive for shortness of breath. Cardiovascular:  Negative for chest pain. Gastrointestinal:  Negative for nausea. Endocrine: Negative for polyuria. Genitourinary:  Negative for dysuria. Musculoskeletal:  Negative for back pain. Skin:  Positive for rash. Negative for color change. Neurological:  Negative for seizures. Hematological:  Does not bruise/bleed easily. Psychiatric/Behavioral:  Negative for hallucinations. Vitals:    04/01/23 1116 04/01/23 1130 04/01/23 1258 04/01/23 1444   BP: 136/79 127/81 125/80 128/81   Pulse: 89 94 94 95   Resp: 27 20 19 18   Temp:    98 °F (36.7 °C)   SpO2: 95% 96% 96% 96%   Weight:       Height:                Physical Exam  Constitutional:       General: He is in acute distress. Appearance: Normal appearance. He is well-developed. He is obese. HENT:      Head: Normocephalic and atraumatic. Comments: No tongue swelling or angioedema     Mouth/Throat:      Mouth: Mucous membranes are moist.      Pharynx: Oropharynx is clear. No pharyngeal swelling. Comments: Uvula midline, no airway compromise  Eyes:      Pupils: Pupils are equal, round, and reactive to light. Cardiovascular:      Rate and Rhythm: Normal rate and regular rhythm. Heart sounds: Normal heart sounds. Pulmonary:      Effort: Pulmonary effort is normal.      Breath sounds: Normal breath sounds. Abdominal:      Palpations: Abdomen is soft. Musculoskeletal:      Cervical back: Normal range of motion. Skin:     General: Skin is dry. Comments: Erythematous wheals noted to chest, back. Redness to face, and eyelids are swollen   Neurological:      General: No focal deficit present. Mental Status: He is alert and oriented to person, place, and time. Psychiatric:         Mood and Affect: Mood normal.         Behavior: Behavior normal.            LABORATORY RESULTS:  Recent Results (from the past 24 hour(s))   METABOLIC PANEL, COMPREHENSIVE    Collection Time: 04/01/23 10:31 AM   Result Value Ref Range    Sodium 140 136 - 145 mmol/L    Potassium 3.9 3.5 - 5.1 mmol/L    Chloride 104 98 - 107 mmol/L    CO2 22 22 - 29 mmol/L    Anion gap 14 5 - 15 mmol/L    Glucose 237 (H) 65 - 100 mg/dL    BUN 29 (H) 6 - 20 MG/DL    Creatinine 0.81 0.70 - 1.20 MG/DL    BUN/Creatinine ratio 36 (H) 12 - 20      eGFR >60 >60 ml/min/1.73m2    Calcium 10.4 (H) 8.6 - 10.0 MG/DL    Bilirubin, total 0.4 0.2 - 1.0 MG/DL    ALT (SGPT) 32 10 - 50 U/L    AST (SGOT) 25 10 - 50 U/L    Alk.  phosphatase 146 (H) 40 - 129 U/L    Protein, total 7.5 6.4 - 8.3 g/dL    Albumin 4.5 3.5 - 5.2 g/dL    Globulin 3.0 2.0 - 4.0 g/dL    A-G Ratio 1.5 1.1 - 2.2     CBC WITH AUTOMATED DIFF    Collection Time: 04/01/23 10:31 AM   Result Value Ref Range    WBC 9.7 4.1 - 11.1 K/uL    RBC 5.72 (H) 4.10 - 5.70 M/uL    HGB 16.9 12.1 - 17.0 g/dL    HCT 49.3 36.6 - 50.3 %    MCV 86.2 80.0 - 99.0 FL    MCH 29.5 26.0 - 34.0 PG    MCHC 34.3 30.0 - 36.5 g/dL    RDW 14.6 (H) 11.5 - 14.5 %    PLATELET 569 561 - 193 K/uL    NRBC 0.0 0  WBC    ABSOLUTE NRBC 0.00 0.00 - 0.01 K/uL    NEUTROPHILS 69 32 - 75 %    LYMPHOCYTES 25 12 - 49 %    MONOCYTES 5 5 - 13 %    EOSINOPHILS 1 0 - 7 %    BASOPHILS 0 0 - 1 %    IMMATURE GRANULOCYTES 1 (H) 0.0 - 0.5 %    ABS. NEUTROPHILS 6.7 1.8 - 8.0 K/UL    ABS. LYMPHOCYTES 2.4 0.8 - 3.5 K/UL    ABS. MONOCYTES 0.5 0.0 - 1.0 K/UL    ABS. EOSINOPHILS 0.1 0.0 - 0.4 K/UL    ABS. BASOPHILS 0.0 0.0 - 0.1 K/UL    ABS. IMM. GRANS. 0.1 (H) 0.00 - 0.04 K/UL    DF AUTOMATED         IMAGING RESULTS:  No results found. MEDICATIONS GIVEN:  Medications   famotidine (PF) (PEPCID) 20 mg in 0.9% sodium chloride 10 mL injection (20 mg IntraVENous Given 4/1/23 1029)   EPINEPHrine HCl (PF) (ADRENALIN) 1 mg/mL (1 mL) injection 0.3 mg (0.3 mg IntraMUSCular Given 4/1/23 1019)   methylPREDNISolone (PF) (Solu-MEDROL) injection 125 mg (125 mg IntraVENous Given 4/1/23 1028)   sodium chloride 0.9 % bolus infusion 1,000 mL (0 mL IntraVENous IV Completed 4/1/23 1444)   famotidine (PF) (PEPCID) 20 mg/2 mL injection (  Given 4/1/23 1029)          Medical Decision Making  48 y/o M Patient presenting with allergic rxn/ anaphylaxis likely due to dust exposure at work. Patient presented with respiratory distress and urticaria. Patient immediately received epinephrine IM, benadryl IM/ IV, methylprednisolone IV, famotidine IV/PO. Ishaan Cardenas Patient's respiratory distress improved after medication. Given sudden onset shortness of breath considered other diagnosis including foreign body aspiration (symmetric breath sounds), asthma exacerbation (unlikely given no hx of asthma and rash as well as rapid onset), and infection (pt not febrile and symptoms improved with anaphylaxis treatment). Will DC home with rx for epi-pen given course of Zyrtec, Pepcid and prednisone taper. They have been symptoms free for at least 4 hours from initial presentation. Encouraged to follow-up with PCP for glucose monitoring while on steroids as well as further work-up of allergy testing. Amount and/or Complexity of Data Reviewed  Labs: ordered. Risk  OTC drugs.   Prescription drug management. Critical Care  Total time providing critical care: 30-74 minutes        Prior to discharge from the emergency department the presentation, management, and disposition were presented and discussed with the attending physician, Dr. Kennedy Rivera, who is in agreement with plan of care and has seen the patient. ED Course as of 04/01/23 1449   Sat Apr 01, 2023   1033 I have just reassessed the patient after administration of epinephrine. He is resting comfortably and states he is already feeling much better, rash has greatly improved he states the shortness of breath is improving as well. [LM]   1050 I have reassessed patient for a second time, he states he is feeling much improved. Vital signs are stable and he is in no acute distress. [LM]   1200 Patient remains stable  [LM]   1440 Patient stable and agreeable with discharge [LM]      ED Course User Index  [LM] Africa Jones NP       Discussed results and work-up with patient and answered all questions, the patient expresses understanding and agrees with the care plan and disposition. The patient was given an opportunity to ask questions and all concerns raised were addressed prior to discharge. Recommended patient follow-up with provider as listed below. Counseled patient on standard home and self-care measures. Specifically explained the emergent conditions that could arise and clearly instructed the patient to return to the emergency department for those and any other new, worsening, or concerning symptoms. Patient stable and ready for discharge. IMPRESSION:  1.  Anaphylaxis, initial encounter        DISPOSITION:  Discharge    PLAN:  Follow-up Information       Follow up With Specialties Details Why [address] [facility], [address] [place] [address], DO Family Medicine Schedule an appointment as soon as possible for a visit  Please make an appointment as soon as possible as they will need to monitor your diabetes while you are taking steroids for this reaction Sameer  [address].  [phone]      [facility] EMERGENCY DEPT Emergency Medicine  As needed, If symptoms worsen [address] [address]  [phone]          Discharge Medication List as of 4/1/2023  2:38 PM        START taking these medications    Details   predniSONE (STERAPRED) 5 mg dose pack See administration instruction per 5mg dose pack, Normal, Disp-21 Tablet, R-0      !! EPINEPHrine (EPIPEN) 0.3 mg/0.3 mL injection 0.3 mL by IntraMUSCular route once as needed for Allergic Response for up to 1 dose., Normal, Disp-1 Each, R-0      famotidine (Pepcid) 20 mg tablet Take 1 Tablet by mouth two (2) times a day for 10 days. , Normal, Disp-20 Tablet, R-0      cetirizine (ZyrTEC) 10 mg tablet Take 1 Tablet by mouth daily. , Normal, Disp-10 Tablet, R-0       !! - Potential duplicate medications found. Please discuss with provider. CONTINUE these medications which have NOT CHANGED    Details   atorvastatin (LIPITOR) 40 mg tablet TAKE 1 TABLET BY MOUTH EVERY DAY, Normal, Disp-90 Tablet, R-0needs appt for additional refills      fenofibrate (LOFIBRA) 160 mg tablet TAKE 1 TABLET BY MOUTH EVERY DAY, Normal, Disp-90 Tablet, R-0needs appt for additional refills      OneTouch Ultra2 Meter misc CHECK BLOOD GLUCOSE ONCE DAILY, DX CODE E11.65, Normal, Disp-1 Each, R-1      metFORMIN (GLUCOPHAGE) 1,000 mg tablet Take 1 Tablet by mouth two (2) times daily (with meals). , Normal, Disp-180 Tablet, R-2      empagliflozin (Jardiance) 10 mg tablet TAKE 1 TABLET BY MOUTH EVERY DAY, Normal, Disp-90 Tablet, R-2NEEDS REFILL      valsartan (DIOVAN) 40 mg tablet Take 1 Tablet by mouth daily. , Normal, Disp-90 Tablet, R-1      !! glucose blood VI test strips (blood glucose test) strip Check bg once daily, dx code E11.65, Normal, Disp-200 Strip, R-11      !! lancets misc Check bg once daily, dx code E11.65, Normal, Disp-200 Each, R-11      !!  EPINEPHrine (EPIPEN) 0.3 mg/0.3 mL injection INJECT 1 SYRINGE INTO THE MUSCLE ONCE AS NEEDED FOR UP TO 1 DOSE, Normal, Disp-1 Each, R-5      terbinafine HCl (LAMISIL) 1 % topical cream Apply  to affected area two (2) times a day., Normal, Disp-30 g, R-0      !! glucose blood VI test strips (ASCENSIA AUTODISC VI, ONE TOUCH ULTRA TEST VI) strip Use for blood glucose monitoring, Normal, Disp-50 Strip, R-5      Blood-Glucose Meter monitoring kit Check blood glucose, Normal, Disp-1 Kit, R-0      !! lancets misc Use with blood glucose monitor, Normal, Disp-1 Each, R-11      cyclobenzaprine (FLEXERIL) 10 mg tablet Take  by mouth three (3) times daily as needed for Muscle Spasm(s). , Historical Med      saw palmetto fruit 450 mg cap Take  by mouth., Historical Med      Mv,Ca,Iron,Min-FA-Phytosterol ([facility]) 3-200-400 mg-mcg-mg tab Take  by mouth., Historical Med      sildenafil citrate (VIAGRA) 25 mg tablet Take 1 Tab by mouth as needed., Normal, Disp-20 Tab, R-3      aspirin 81 mg chewable tablet Take 81 mg by mouth daily. , Historical Med      omega-3 acid ethyl esters (LOVAZA) 1 gram capsule Take 1 g by mouth two (2) times a day., Historical Med       !! - Potential duplicate medications found. Please discuss with provider. Please note that this dictation was completed with [employer], the computer voice recognition software. Quite often unanticipated grammatical, syntax, homophones, and other interpretive errors are inadvertently transcribed by the computer software. Please disregard these errors. Please excuse any errors that have escaped final proofreading.

## 2023-04-03 LAB
ATRIAL RATE: 90 BPM
CALCULATED P AXIS, ECG09: 35 DEGREES
CALCULATED R AXIS, ECG10: 14 DEGREES
CALCULATED T AXIS, ECG11: 41 DEGREES
DIAGNOSIS, 93000: NORMAL
P-R INTERVAL, ECG05: 158 MS
Q-T INTERVAL, ECG07: 338 MS
QRS DURATION, ECG06: 76 MS
QTC CALCULATION (BEZET), ECG08: 413 MS
VENTRICULAR RATE, ECG03: 90 BPM

## 2023-04-29 RX ORDER — LANCETS 30 GAUGE
EACH MISCELLANEOUS
COMMUNITY
Start: 2022-05-09

## 2023-08-03 RX ORDER — BLOOD SUGAR DIAGNOSTIC
STRIP MISCELLANEOUS
Qty: 100 STRIP | Refills: 47 | OUTPATIENT
Start: 2023-08-03

## 2023-08-15 ENCOUNTER — CLINICAL DOCUMENTATION (OUTPATIENT)
Age: 48
End: 2023-08-15

## 2023-08-15 NOTE — PROGRESS NOTES
Called through [employer]. (ID no [id number])  Mobile phone: voice mail box not set up message. Home number: left message to call back to schedule NP appointment.  Ref by [facility].

## 2025-03-12 NOTE — PROGRESS NOTES
Appointment with new PCP ,Dr Wendie Molina on AVS by CM specialist.Pt is in process of being discharged.   Dylan Hernandez no